# Patient Record
Sex: FEMALE | Race: WHITE | NOT HISPANIC OR LATINO | Employment: UNEMPLOYED | URBAN - NONMETROPOLITAN AREA
[De-identification: names, ages, dates, MRNs, and addresses within clinical notes are randomized per-mention and may not be internally consistent; named-entity substitution may affect disease eponyms.]

---

## 2019-05-03 ENCOUNTER — HOSPITAL ENCOUNTER (EMERGENCY)
Facility: HOSPITAL | Age: 39
Discharge: HOME | End: 2019-05-03
Attending: EMERGENCY MEDICINE | Admitting: EMERGENCY MEDICINE

## 2019-05-03 ENCOUNTER — APPOINTMENT (OUTPATIENT)
Dept: GENERAL RADIOLOGY | Facility: HOSPITAL | Age: 39
End: 2019-05-03

## 2019-05-03 VITALS
BODY MASS INDEX: 25.36 KG/M2 | RESPIRATION RATE: 20 BRPM | TEMPERATURE: 98.1 F | SYSTOLIC BLOOD PRESSURE: 128 MMHG | WEIGHT: 143.1 LBS | DIASTOLIC BLOOD PRESSURE: 95 MMHG | HEIGHT: 63 IN | HEART RATE: 101 BPM | OXYGEN SATURATION: 99 %

## 2019-05-03 DIAGNOSIS — S46.911A STRAIN OF RIGHT SHOULDER, INITIAL ENCOUNTER: Primary | ICD-10-CM

## 2019-05-03 PROCEDURE — 73030 X-RAY EXAM OF SHOULDER: CPT

## 2019-05-03 PROCEDURE — 99283 EMERGENCY DEPT VISIT LOW MDM: CPT

## 2019-05-03 PROCEDURE — 25010000002 ORPHENADRINE CITRATE PER 60 MG: Performed by: PHYSICIAN ASSISTANT

## 2019-05-03 PROCEDURE — 96372 THER/PROPH/DIAG INJ SC/IM: CPT

## 2019-05-03 PROCEDURE — 25010000002 KETOROLAC TROMETHAMINE PER 15 MG: Performed by: PHYSICIAN ASSISTANT

## 2019-05-03 RX ORDER — KETOROLAC TROMETHAMINE 30 MG/ML
30 INJECTION, SOLUTION INTRAMUSCULAR; INTRAVENOUS EVERY 6 HOURS PRN
Status: DISCONTINUED | OUTPATIENT
Start: 2019-05-03 | End: 2019-05-03 | Stop reason: HOSPADM

## 2019-05-03 RX ORDER — KETOROLAC TROMETHAMINE 10 MG/1
10 TABLET, FILM COATED ORAL EVERY 6 HOURS PRN
Qty: 20 TABLET | Refills: 0 | Status: SHIPPED | OUTPATIENT
Start: 2019-05-03 | End: 2019-05-08

## 2019-05-03 RX ORDER — ORPHENADRINE CITRATE 30 MG/ML
60 INJECTION INTRAMUSCULAR; INTRAVENOUS ONCE
Status: COMPLETED | OUTPATIENT
Start: 2019-05-03 | End: 2019-05-03

## 2019-05-03 RX ORDER — CYCLOBENZAPRINE HCL 10 MG
10 TABLET ORAL 2 TIMES DAILY PRN
Qty: 14 TABLET | Refills: 0 | Status: SHIPPED | OUTPATIENT
Start: 2019-05-03 | End: 2019-05-10

## 2019-05-03 RX ORDER — HYDROCODONE BITARTRATE AND ACETAMINOPHEN 5; 325 MG/1; MG/1
1 TABLET ORAL ONCE
Status: COMPLETED | OUTPATIENT
Start: 2019-05-03 | End: 2019-05-03

## 2019-05-03 RX ADMIN — KETOROLAC TROMETHAMINE 30 MG: 30 INJECTION, SOLUTION INTRAMUSCULAR at 14:31

## 2019-05-03 RX ADMIN — HYDROCODONE BITARTRATE AND ACETAMINOPHEN 1 TABLET: 5; 325 TABLET ORAL at 15:27

## 2019-05-03 RX ADMIN — ORPHENADRINE CITRATE 60 MG: 30 INJECTION INTRAMUSCULAR; INTRAVENOUS at 14:31

## 2019-05-03 NOTE — DISCHARGE INSTRUCTIONS
Please call [facility] in 3 days if pain does not resolve and to establish a primary care doctor. Please rest the next 7 days with light activity. Return to the ER with any concerning or worsening symptoms.

## 2019-05-03 NOTE — ED PROVIDER NOTES
Subjective   Pt reports she was moving furniture 3 weeks ago when R shoulder pain started and now in the ED due to worsening shoulder pain.         History provided by:  Patient   used: No    Upper Extremity Issue   Location:  Shoulder  Shoulder location:  R shoulder  Pain details:     Quality:  Sharp    Severity:  Moderate    Onset quality:  Gradual    Duration:  3 weeks    Timing:  Constant    Progression:  Worsening  Worsened by:  Movement  Ineffective treatments:  NSAIDs  Associated symptoms: no fatigue        Review of Systems   Constitutional: Negative for fatigue.   HENT: Negative for congestion.    Respiratory: Negative for cough and shortness of breath.    Gastrointestinal: Negative for vomiting.   Endocrine: Negative for polyuria.   Musculoskeletal: Positive for arthralgias (R shoulder).   Skin: Negative for color change.   Neurological: Negative for syncope.   Psychiatric/Behavioral: Negative for agitation.   All other systems reviewed and are negative.      Past Medical History:   Diagnosis Date   • Migraine        No Known Allergies    Past Surgical History:   Procedure Laterality Date   • ANKLE SURGERY Right    • CHOLECYSTECTOMY     • HYSTERECTOMY     • WRIST SURGERY         History reviewed. No pertinent family history.    Social History     Socioeconomic History   • Marital status:      Spouse name: Not on file   • Number of children: Not on file   • Years of education: Not on file   • Highest education level: Not on file   Tobacco Use   • Smoking status: Current Every Day Smoker     Packs/day: 0.50     Types: Cigarettes   Substance and Sexual Activity   • Alcohol use: Yes     Frequency: Never     Comment: socially   • Drug use: No           Objective   Physical Exam   Constitutional: She is oriented to person, place, and time. She appears well-developed and well-nourished.   HENT:   Head: Normocephalic.   Right Ear: Hearing normal.   Left Ear: Hearing normal.   Nose: Nose  normal.   Eyes: Conjunctivae, EOM and lids are normal.   Neck: Trachea normal and full passive range of motion without pain.   Cardiovascular: Regular rhythm, S1 normal, S2 normal, normal heart sounds and normal pulses.   Pulmonary/Chest: Effort normal and breath sounds normal.   Abdominal: Normal appearance and bowel sounds are normal.   Musculoskeletal:        Right shoulder: She exhibits decreased range of motion and tenderness.   Bilateral UE: neurovascularly intact   Neurological: She is alert and oriented to person, place, and time. She is not disoriented.   Skin: Skin is warm and dry. She is not diaphoretic.   Psychiatric: She has a normal mood and affect. Her speech is normal and behavior is normal. Thought content normal.   Nursing note and vitals reviewed.      Procedures           Labs Reviewed - No data to display    XR Shoulder 2+ View Right   Final Result   CONCLUSION:   Normal right shoulder      [id number]      Electronically signed by:  Brent Elias MD  5/3/2019 2:56 PM CDT   Workstation: [email or web address]            ED Course        3:43P  Rechecked pt and informed unremarkable x-ray results. Will discharge on toradol and muscle relaxers. Pt agrees with plan of care and all questions addressed at this time.           MDM      Final diagnoses:   Strain of right shoulder, initial encounter            Millie Brar PA-C  05/08/19 0939

## 2019-05-21 ENCOUNTER — OFFICE VISIT (OUTPATIENT)
Dept: ORTHOPEDIC SURGERY | Facility: CLINIC | Age: 39
End: 2019-05-21

## 2019-05-21 VITALS — WEIGHT: 143 LBS | BODY MASS INDEX: 25.34 KG/M2 | HEIGHT: 63 IN

## 2019-05-21 DIAGNOSIS — M54.2 CERVICAL SPINE PAIN: ICD-10-CM

## 2019-05-21 DIAGNOSIS — M25.511 RIGHT SHOULDER PAIN, UNSPECIFIED CHRONICITY: ICD-10-CM

## 2019-05-21 DIAGNOSIS — M79.2 RADICULAR PAIN OF RIGHT UPPER EXTREMITY: ICD-10-CM

## 2019-05-21 DIAGNOSIS — M75.101 ROTATOR CUFF SYNDROME, RIGHT: Primary | ICD-10-CM

## 2019-05-21 PROCEDURE — 99406 BEHAV CHNG SMOKING 3-10 MIN: CPT | Performed by: ORTHOPAEDIC SURGERY

## 2019-05-21 PROCEDURE — 99204 OFFICE O/P NEW MOD 45 MIN: CPT | Performed by: ORTHOPAEDIC SURGERY

## 2019-05-21 RX ORDER — MELOXICAM 15 MG/1
TABLET ORAL
Qty: 30 TABLET | Refills: 3 | Status: SHIPPED | OUTPATIENT
Start: 2019-05-21 | End: 2019-08-22

## 2019-05-21 NOTE — PROGRESS NOTES
Anne-Marie Lu is a 38 y.o. female   Primary provider:  Provider, No Known       Chief Complaint   Patient presents with   • Right Shoulder - Pain       HISTORY OF PRESENT ILLNESS: Patient is here today for right shoulder pain. Patient states that she has pain from her mid clavicle to her mid upper arm. Patient brought a disc with xrays and MRI. She states that her pain is a constant 8/10.    Patient states that she had a torn muscle in her shoulder approximately 6 months ago.  She was working as a  and had a pop while sliding a 50 pound box under a counter.  She states that since then she is been having some burning pains.  She was doing physical therapy for approximately 6 weeks and got some better but continued having difficulties.  Patient states that she had an MRI which showed tearing of the muscles at that time.      She states that she saw orthopedics again a couple of weeks ago with a new MRI which showed that she had bone spurs but did not have tearing of the muscle.  She was recommended at that time to restart physical therapy and had a steroid injection.  She states that she has had no improvement from the steroid injection.  She states that she has a burning feeling and she feels like her arm is on fire from the base of her neck to care home down her upper arm.  She reports no new injury.    She states that she was taking naproxen but that she was having stomach irritation because of that.  She also reports having had prior ulcers.    History of Present Illness     CONCURRENT MEDICAL HISTORY:    Past Medical History:   Diagnosis Date   • Migraine        No Known Allergies      Current Outpatient Medications:   •  meloxicam (MOBIC) 15 MG tablet, 1 PO Daily with food., Disp: 30 tablet, Rfl: 3    Past Surgical History:   Procedure Laterality Date   • ANKLE SURGERY Right    • CHOLECYSTECTOMY     • HYSTERECTOMY     • WRIST SURGERY         History reviewed. No pertinent family history.     Social  "History     Socioeconomic History   • Marital status:      Spouse name: Not on file   • Number of children: Not on file   • Years of education: Not on file   • Highest education level: Not on file   Tobacco Use   • Smoking status: Current Every Day Smoker     Packs/day: 0.50     Types: Cigarettes   • Smokeless tobacco: Never Used   Substance and Sexual Activity   • Alcohol use: Yes     Frequency: Never     Comment: socially   • Drug use: No        Review of Systems   Constitutional: Negative.    HENT: Positive for postnasal drip.    Eyes: Negative.    Respiratory: Negative.    Cardiovascular: Negative.    Gastrointestinal: Negative.    Endocrine: Positive for cold intolerance and heat intolerance.   Genitourinary: Negative.    Musculoskeletal:        Right shoulder pain   Skin: Negative.    Allergic/Immunologic: Negative.    Neurological: Positive for headaches.   Hematological: Negative.    Psychiatric/Behavioral: Negative.        PHYSICAL EXAMINATION:       Ht 160 cm (63\")   Wt 64.9 kg (143 lb)   BMI 25.33 kg/m²     Physical Exam   Constitutional: She is oriented to person, place, and time. She appears well-developed and well-nourished. No distress.   Eyes: Conjunctivae are normal. Pupils are equal, round, and reactive to light.   Neck: Neck supple. No tracheal deviation present. No thyromegaly present.   Cardiovascular: Normal rate and intact distal pulses.   Pulmonary/Chest: Effort normal. She exhibits no tenderness.   Abdominal: Soft. She exhibits no distension and no mass. There is no tenderness.   Neurological: She is alert and oriented to person, place, and time.   Skin: Skin is warm. No rash noted.   Psychiatric: She has a normal mood and affect. Her behavior is normal. Judgment and thought content normal.       GAIT:     [x]  Normal  []  Antalgic    Assistive device: [x]  None  []  Walker     []  Crutches  []  Cane     []  Wheelchair  []  Stretcher    Right Shoulder Exam     Tenderness   The " patient is experiencing tenderness in the acromioclavicular joint and acromion.    Range of Motion   Active abduction: 70 (120 passively)   Forward flexion: 70 (130 passively)   Right shoulder internal rotation 0 degrees: To buttock.     Muscle Strength   Abduction: 3/5   Supraspinatus: 3/5     Tests   Lake test: positive  Cross arm: positive  Impingement: positive    Other   Erythema: absent  Sensation: normal  Pulse: present      Left Shoulder Exam     Tenderness   The patient is experiencing no tenderness.     Range of Motion   The patient has normal left shoulder ROM.    Muscle Strength   The patient has normal left shoulder strength.    Tests   Lake test: negative  Impingement: negative    Other   Erythema: absent  Sensation: normal  Pulse: present               Xr Shoulder 2+ View Right    Result Date: 5/3/2019  Narrative: Three view right shoulder HISTORY: Right shoulder pain AP films with the humerus in internal and external rotation and scapular Y view were obtained. COMPARISON: None No fracture or dislocation. No other osseous or articular abnormality.     Impression: CONCLUSION: Normal right shoulder [id number] Electronically signed by:  Brent Elias MD  5/3/2019 2:56 PM CDT Workstation: [email or web address]    Body mass index is 25.33 kg/m².      ASSESSMENT:    Diagnoses and all orders for this visit:    Rotator cuff syndrome, right    Right shoulder pain, unspecified chronicity  -     Cancel: XR Shoulder 2+ View Right  -     meloxicam (MOBIC) 15 MG tablet; 1 PO Daily with food.  -     Ambulatory Referral to Physical Therapy Evaluate and treat    Cervical spine pain  -     meloxicam (MOBIC) 15 MG tablet; 1 PO Daily with food.  -     Ambulatory Referral to Physical Therapy Evaluate and treat    Radicular pain of right upper extremity  -     meloxicam (MOBIC) 15 MG tablet; 1 PO Daily with food.  -     Ambulatory Referral to Physical Therapy Evaluate and treat          PLAN    I personally reviewed the MRI from  last week.  However, the MRI report was not available.  Patient also reportedly has an MRI approximately 6 months ago which was not made available to me.  And long discussion with patient regarding our further treatment options.    Firstly, we discussed that I needed the old MRI and the MRI reports in order to make a reasonable decision about the next steps treatment.  Secondly, we discussed continuing physical therapy with general motion and strengthening exercises.  We also discussed the possibility of cervical traction as a part of her therapy process.  Thirdly, we discussed a trial of meloxicam for anti-inflammatory medication.  Fourth, we discussed the numbness and tingling and burning sensation that she is having.  This may be more related to cervical spine or muscle spasm and should not be related directly to rotator cuff issues.  We discussed the possibility of an EMG to further assess the symptoms.  And finally, we discussed smoking cessation to help facilitate healing of any potential surgical intervention.        I advised Anne-Marie of the risks of continuing to use tobacco.    During this visit, I spent 4 minutes counseling the patient regarding tobacco cessation.      Return in about 4 weeks (around 6/18/2019) for recheck.        This document has been electronically signed by Mat Liu MD on May 22, 2019 1:47 PM

## 2019-08-22 ENCOUNTER — OFFICE VISIT (OUTPATIENT)
Dept: FAMILY MEDICINE CLINIC | Facility: CLINIC | Age: 39
End: 2019-08-22

## 2019-08-22 ENCOUNTER — LAB (OUTPATIENT)
Dept: LAB | Facility: HOSPITAL | Age: 39
End: 2019-08-22

## 2019-08-22 VITALS
HEIGHT: 63 IN | DIASTOLIC BLOOD PRESSURE: 80 MMHG | TEMPERATURE: 98.8 F | BODY MASS INDEX: 23.92 KG/M2 | HEART RATE: 78 BPM | WEIGHT: 135 LBS | SYSTOLIC BLOOD PRESSURE: 122 MMHG | OXYGEN SATURATION: 98 %

## 2019-08-22 DIAGNOSIS — G47.00 INSOMNIA, UNSPECIFIED TYPE: ICD-10-CM

## 2019-08-22 DIAGNOSIS — K21.9 GASTROESOPHAGEAL REFLUX DISEASE, ESOPHAGITIS PRESENCE NOT SPECIFIED: ICD-10-CM

## 2019-08-22 DIAGNOSIS — G93.31 POSTVIRAL FATIGUE SYNDROME: ICD-10-CM

## 2019-08-22 DIAGNOSIS — K21.9 GASTROESOPHAGEAL REFLUX DISEASE, ESOPHAGITIS PRESENCE NOT SPECIFIED: Primary | ICD-10-CM

## 2019-08-22 DIAGNOSIS — M25.50 POLYARTHRALGIA: ICD-10-CM

## 2019-08-22 PROCEDURE — 86140 C-REACTIVE PROTEIN: CPT

## 2019-08-22 PROCEDURE — 86038 ANTINUCLEAR ANTIBODIES: CPT

## 2019-08-22 PROCEDURE — 85027 COMPLETE CBC AUTOMATED: CPT

## 2019-08-22 PROCEDURE — 86664 EPSTEIN-BARR NUCLEAR ANTIGEN: CPT

## 2019-08-22 PROCEDURE — 80053 COMPREHEN METABOLIC PANEL: CPT

## 2019-08-22 PROCEDURE — 86665 EPSTEIN-BARR CAPSID VCA: CPT

## 2019-08-22 PROCEDURE — 36415 COLL VENOUS BLD VENIPUNCTURE: CPT

## 2019-08-22 PROCEDURE — 80074 ACUTE HEPATITIS PANEL: CPT

## 2019-08-22 PROCEDURE — 99203 OFFICE O/P NEW LOW 30 MIN: CPT | Performed by: FAMILY MEDICINE

## 2019-08-22 PROCEDURE — 86663 EPSTEIN-BARR ANTIBODY: CPT

## 2019-08-22 RX ORDER — PREDNISONE 20 MG/1
20 TABLET ORAL DAILY
Qty: 14 TABLET | Refills: 0 | Status: SHIPPED | OUTPATIENT
Start: 2019-08-22 | End: 2019-09-04

## 2019-08-22 RX ORDER — TRAZODONE HYDROCHLORIDE 50 MG/1
50 TABLET ORAL NIGHTLY
Qty: 30 TABLET | Refills: 3 | Status: SHIPPED | OUTPATIENT
Start: 2019-08-22 | End: 2019-12-05 | Stop reason: SDUPTHER

## 2019-08-22 RX ORDER — PANTOPRAZOLE SODIUM 40 MG/1
40 TABLET, DELAYED RELEASE ORAL DAILY
COMMUNITY
End: 2019-10-07 | Stop reason: SDUPTHER

## 2019-08-22 RX ORDER — MELOXICAM 7.5 MG/1
7.5 TABLET ORAL DAILY
Status: DISCONTINUED | OUTPATIENT
Start: 2019-08-22 | End: 2019-09-18

## 2019-08-22 RX ORDER — SUCRALFATE 1 G/1
1 TABLET ORAL 4 TIMES DAILY
COMMUNITY

## 2019-08-22 RX ORDER — OMEGA-3S/DHA/EPA/FISH OIL/D3 300MG-1000
400 CAPSULE ORAL DAILY
COMMUNITY
End: 2019-10-01

## 2019-08-23 LAB
ALBUMIN SERPL-MCNC: 5.3 G/DL (ref 3.5–5.2)
ALBUMIN/GLOB SERPL: 2 G/DL
ALP SERPL-CCNC: 70 U/L (ref 39–117)
ALT SERPL W P-5'-P-CCNC: 6 U/L (ref 1–33)
ANION GAP SERPL CALCULATED.3IONS-SCNC: 12.1 MMOL/L (ref 5–15)
AST SERPL-CCNC: 16 U/L (ref 1–32)
BILIRUB SERPL-MCNC: 0.3 MG/DL (ref 0.2–1.2)
BUN BLD-MCNC: 10 MG/DL (ref 6–20)
BUN/CREAT SERPL: 10.8 (ref 7–25)
CALCIUM SPEC-SCNC: 9.9 MG/DL (ref 8.6–10.5)
CHLORIDE SERPL-SCNC: 102 MMOL/L (ref 98–107)
CO2 SERPL-SCNC: 25.9 MMOL/L (ref 22–29)
CREAT BLD-MCNC: 0.93 MG/DL (ref 0.57–1)
CRP SERPL-MCNC: 0.07 MG/DL (ref 0–0.5)
DEPRECATED RDW RBC AUTO: 40.6 FL (ref 37–54)
ERYTHROCYTE [DISTWIDTH] IN BLOOD BY AUTOMATED COUNT: 12.4 % (ref 12.3–15.4)
GFR SERPL CREATININE-BSD FRML MDRD: 67 ML/MIN/1.73
GLOBULIN UR ELPH-MCNC: 2.7 GM/DL
GLUCOSE BLD-MCNC: 81 MG/DL (ref 65–99)
HAV IGM SERPL QL IA: NORMAL
HBV CORE IGM SERPL QL IA: NORMAL
HBV SURFACE AG SERPL QL IA: NORMAL
HCT VFR BLD AUTO: 40.7 % (ref 34–46.6)
HCV AB SER DONR QL: NORMAL
HGB BLD-MCNC: 13.4 G/DL (ref 12–15.9)
MCH RBC QN AUTO: 29.5 PG (ref 26.6–33)
MCHC RBC AUTO-ENTMCNC: 32.9 G/DL (ref 31.5–35.7)
MCV RBC AUTO: 89.6 FL (ref 79–97)
PLATELET # BLD AUTO: 272 10*3/MM3 (ref 140–450)
PMV BLD AUTO: 11.7 FL (ref 6–12)
POTASSIUM BLD-SCNC: 3.8 MMOL/L (ref 3.5–5.2)
PROT SERPL-MCNC: 8 G/DL (ref 6–8.5)
RBC # BLD AUTO: 4.54 10*6/MM3 (ref 3.77–5.28)
SODIUM BLD-SCNC: 140 MMOL/L (ref 136–145)
WBC NRBC COR # BLD: 5.79 10*3/MM3 (ref 3.4–10.8)

## 2019-08-23 NOTE — PROGRESS NOTES
"  Subjective:     Anne-Marie Ellison is a 38 y.o. female who presents for   Chief Complaint   Patient presents with   • Establish Care          38-year-old female with history of either peptic ulcer disease or GERD on proton pump inhibitor and Carafate easily managed by gastroenterology resents today for first-time visit to establish care.  She recently moved to [place] from [facility] she has had recurrent UTIs and for the past several months has had severe fatigue and generalized pain and arthralgia.  She notes onset of symptoms 6 to 8 months ago when she presented to the emergency room in [place] with a urinary tract infection.  She was seen 6 months later and told that they treated her with \"the wrong antibiotic\" since then her UTI symptoms have resolved however she continues to feel \"horrible\" she notes extreme fatigue and generalized arthralgias and myalgias.  She states that her UTI initially presented as a flulike illness and she continues to have symptoms although she denies UTI symptoms at present she was last seen in June of this year at urgent care and has previously seen primary care physician on several occasions but has not really had any significant relief work-up to date including work-up for tickborne illness has been negative she has been treated with multivitamins vitamin D and D after being told that her levels were in the low normal range however she has persistent pain in her hands knees ankles and hips with severe fatigue she also notes difficulty getting rest at night had previously been treated in [place] with high doses of Seroquel and Trileptal without success she had been treated with trazodone in the remote past but she does not recall if it helped or not she denies history of drug or alcohol abuse or substance abuse although she does drink socially.  Her  recently relocated to [place] and reportedly they are looking forward to his long term " when they can move back to [place] where she grew up.  She denies chest pain or dyspnea she does have a history of severe reflux with epigastric pain has been followed by gastroenterology in the past she currently is on PPI and Carafate and avoids foods which seem to exacerbate her symptoms.  Attempted treatment with nonsteroidals have caused GI flareups in the past she denies any nausea or vomiting otherwise no change in bowel habits denies per rectum she has had some fluctuations in her weight recently losing about 25 pounds but gaining back about 8 pounds attempts at treating her thumbnail with various OTC middays have met with little success these include Eletone and and diphenhydramine.  She has 3 children ages 1419 and 21 with her youngest still living at home she smokes 3 cigarettes a day denies EtOH or substance abuse         Past Medical Hx:  Past Medical History:   Diagnosis Date   • Migraine        Past Surgical Hx:  Past Surgical History:   Procedure Laterality Date   • ANKLE SURGERY Right    • CHOLECYSTECTOMY     • HYSTERECTOMY     • WRIST SURGERY         Health Maintenance:  Health Maintenance   Topic Date Due   • PNEUMOCOCCAL VACCINE (19-64 MEDIUM RISK) (1 of 1 - PPSV23) 12/07/1998   • TDAP/TD VACCINES (1 - Tdap) 12/07/1998   • MEDICARE ANNUAL WELLNESS  05/21/2019   • INFLUENZA VACCINE  08/01/2019       Current Meds:    Current Outpatient Medications:   •  B Complex Vitamins (B COMPLEX-B12 PO), Take  by mouth Daily., Disp: , Rfl:   •  cholecalciferol (VITAMIN D3) 400 units tablet, Take 400 Units by mouth Daily., Disp: , Rfl:   •  cholecalciferol (VITAMIN D3) 34347 units capsule, Take 50,000 Units by mouth 1 (One) Time Per Week., Disp: , Rfl:   •  pantoprazole (PROTONIX) 40 MG EC tablet, Take 40 mg by mouth Daily., Disp: , Rfl:   •  sucralfate (CARAFATE) 1 g tablet, Take 1 g by mouth 4 (Four) Times a Day., Disp: , Rfl:   •  predniSONE (DELTASONE) 20 MG tablet, Take 1 tablet by mouth Daily., Disp: 14  "tablet, Rfl: 0  •  traZODone (DESYREL) 50 MG tablet, Take 1 tablet by mouth Every Night., Disp: 30 tablet, Rfl: 3    Current Facility-Administered Medications:   •  meloxicam (MOBIC) tablet 7.5 mg, 7.5 mg, Oral, Daily, Zachary Nagy MD    Allergies:  Nsaids    Family Hx:  No family history on file.     Social History:  Social History     Socioeconomic History   • Marital status:      Spouse name: Not on file   • Number of children: Not on file   • Years of education: Not on file   • Highest education level: Not on file   Tobacco Use   • Smoking status: Current Every Day Smoker     Packs/day: 0.50     Types: Cigarettes   • Smokeless tobacco: Never Used   Substance and Sexual Activity   • Alcohol use: Yes     Frequency: Never     Comment: socially   • Drug use: No       Review of Systems  Review of Systems as per HPI otherwise -12 systems review    Objective:     /80 (BP Location: Left arm)   Pulse 78   Temp 98.8 °F (37.1 °C)   Ht 160 cm (62.99\")   Wt 61.2 kg (135 lb)   SpO2 98%   BMI 23.92 kg/m²   Physical Exam   General appearance is that of a well-developed well-nourished appearing adult female no acute distress oriented x3 HEENT grossly normal TMs normal pharynx clear no facial asymmetry noted neck supple without JVD bruits adenopathy or thyromegaly lungs are clear to auscultation and percussion without wheezes rales rhonchi tactile fremitus or pleural friction rub heart regular without murmur rub gallop or extra PMI diffuse abdomen soft nontender no guarding or rebound extremities show no clubbing cyanosis edema or spasticity no tremor neuro nonfocal no ataxia    Lab Review  No results found for this or any previous visit.         Assessment:     Anne-Marie was seen today for establish care.    Diagnoses and all orders for this visit:    Gastroesophageal reflux disease, esophagitis presence not specified  -     CBC No Differential; Future    Polyarthralgia  -     Comprehensive metabolic panel; " Future  -     C-reactive protein; Future  -     CBC No Differential; Future  -     SUHAIL; Future  -     meloxicam (MOBIC) tablet 7.5 mg    Postviral fatigue syndrome  -     Comprehensive metabolic panel; Future  -     C-reactive protein; Future  -     CBC No Differential; Future  -     Hepatitis panel, acute; Future  -     EBV, Chrnic / Active Infection; Future    Insomnia, unspecified type    Other orders  -     traZODone (DESYREL) 50 MG tablet; Take 1 tablet by mouth Every Night.  -     predniSONE (DELTASONE) 20 MG tablet; Take 1 tablet by mouth Daily.        Plan:   Will check CMP CBC CRP EBV hepatitis panel trial of therapy with meloxicam 7.5 daily prednisone 80 mg daily for 10 days consider polymyalgia rheumatica and differential versus EBV or hepatitis or other viral illness add trazodone 50 nightly continue current regimen otherwise recheck 2 weeks or as needed            This document has been electronically signed by Zachary Nagy MD on August 23, 2019 8:41 AM

## 2019-08-24 LAB
EBV EA IGG SER-ACNC: <9 U/ML (ref 0–8.9)
EBV NA IGG SER IA-ACNC: >600 U/ML (ref 0–17.9)
EBV VCA IGG SER-ACNC: >600 U/ML (ref 0–17.9)
INTERPRETATION: ABNORMAL

## 2019-08-26 LAB — ANA SER QL: NEGATIVE

## 2019-09-03 ENCOUNTER — TELEPHONE (OUTPATIENT)
Dept: FAMILY MEDICINE CLINIC | Facility: CLINIC | Age: 39
End: 2019-09-03

## 2019-09-03 NOTE — TELEPHONE ENCOUNTER
Patient called asking for a refill on her predniSONE (DELTASONE) 20 MG tablet. Patient uses [facility] in [place].    Thanks,  Zoë

## 2019-09-03 NOTE — TELEPHONE ENCOUNTER
Can decrease to 10 mg daily for 7 days then one half of the 10 mg tablet for 7 days then discontinue

## 2019-09-04 RX ORDER — PREDNISONE 10 MG/1
TABLET ORAL
Qty: 12 TABLET | Refills: 0 | Status: SHIPPED | OUTPATIENT
Start: 2019-09-04 | End: 2019-09-10 | Stop reason: DRUGHIGH

## 2019-09-10 ENCOUNTER — OFFICE VISIT (OUTPATIENT)
Dept: FAMILY MEDICINE CLINIC | Facility: CLINIC | Age: 39
End: 2019-09-10

## 2019-09-10 VITALS
OXYGEN SATURATION: 99 % | SYSTOLIC BLOOD PRESSURE: 110 MMHG | DIASTOLIC BLOOD PRESSURE: 68 MMHG | WEIGHT: 137 LBS | HEIGHT: 63 IN | BODY MASS INDEX: 24.27 KG/M2 | HEART RATE: 82 BPM

## 2019-09-10 DIAGNOSIS — M25.50 POLYARTHRALGIA: Primary | ICD-10-CM

## 2019-09-10 PROCEDURE — 99213 OFFICE O/P EST LOW 20 MIN: CPT | Performed by: FAMILY MEDICINE

## 2019-09-10 RX ORDER — DULOXETIN HYDROCHLORIDE 30 MG/1
30 CAPSULE, DELAYED RELEASE ORAL DAILY
Qty: 30 CAPSULE | Refills: 2 | Status: SHIPPED | OUTPATIENT
Start: 2019-09-10 | End: 2019-12-05 | Stop reason: SDUPTHER

## 2019-09-10 RX ORDER — PREDNISONE 1 MG/1
TABLET ORAL
Qty: 7 TABLET | Refills: 0 | Status: ON HOLD | OUTPATIENT
Start: 2019-09-10 | End: 2019-10-07

## 2019-09-10 NOTE — PROGRESS NOTES
Subjective:     Anne-Marie Ellison is a 38 y.o. female who presents for   Chief Complaint   Patient presents with   • Heartburn   • Results     labs          38-year-old female with history of EBV and poly-myalgia/polyarthralgia's presents today for one-month follow-up visit.  Lab work for her polyarthralgias came back essentially negative did have positive EBV titers and her fatigue is markedly improved on low-dose prednisone however she continues to have pain in the hip shoulder girdle distribution she states it is severe at times and was really not relieved with meloxicam.  She also has a history of gastritis currently managed by gastroenterology on PPI and Carafate GI symptoms are stable at this time         Past Medical Hx:  Past Medical History:   Diagnosis Date   • Migraine        Past Surgical Hx:  Past Surgical History:   Procedure Laterality Date   • ANKLE SURGERY Right    • CHOLECYSTECTOMY     • HYSTERECTOMY     • WRIST SURGERY         Health Maintenance:  Health Maintenance   Topic Date Due   • PNEUMOCOCCAL VACCINE (19-64 MEDIUM RISK) (1 of 1 - PPSV23) 12/07/1998   • TDAP/TD VACCINES (1 - Tdap) 12/07/1998   • MEDICARE ANNUAL WELLNESS  05/21/2019   • INFLUENZA VACCINE  08/01/2019       Current Meds:    Current Outpatient Medications:   •  B Complex Vitamins (B COMPLEX-B12 PO), Take  by mouth Daily., Disp: , Rfl:   •  cholecalciferol (VITAMIN D3) 400 units tablet, Take 400 Units by mouth Daily., Disp: , Rfl:   •  cholecalciferol (VITAMIN D3) 10670 units capsule, Take 50,000 Units by mouth 1 (One) Time Per Week., Disp: , Rfl:   •  pantoprazole (PROTONIX) 40 MG EC tablet, Take 40 mg by mouth Daily., Disp: , Rfl:   •  sucralfate (CARAFATE) 1 g tablet, Take 1 g by mouth 4 (Four) Times a Day., Disp: , Rfl:   •  traZODone (DESYREL) 50 MG tablet, Take 1 tablet by mouth Every Night., Disp: 30 tablet, Rfl: 3  •  DULoxetine (CYMBALTA) 30 MG capsule, Take 1 capsule by mouth Daily., Disp: 30 capsule, Rfl: 2  •   "predniSONE (DELTASONE) 5 MG tablet, Take 1/2 tablet daily for 2 weeks then discontinue, Disp: 7 tablet, Rfl: 0    Current Facility-Administered Medications:   •  meloxicam (MOBIC) tablet 7.5 mg, 7.5 mg, Oral, Daily, Zachary Nagy MD    Allergies:  Nsaids    Family Hx:  No family history on file.     Social History:  Social History     Socioeconomic History   • Marital status:      Spouse name: Not on file   • Number of children: Not on file   • Years of education: Not on file   • Highest education level: Not on file   Tobacco Use   • Smoking status: Current Every Day Smoker     Packs/day: 0.50     Types: Cigarettes   • Smokeless tobacco: Never Used   Substance and Sexual Activity   • Alcohol use: Yes     Frequency: Never     Comment: socially   • Drug use: No       Review of Systems  Review of Systems as per HPI no fevers or night sweats no change in bowel habits no weight loss she still gets short of breath with exertion and reportedly is down to 2 or 3 cigarettes a day she continues to work full-time as a     Objective:     /68 (BP Location: Left arm)   Pulse 82   Ht 160 cm (62.99\")   Wt 62.1 kg (137 lb)   SpO2 99%   BMI 24.27 kg/m²   Physical Exam   Exam alert no distress ENT grossly normal neck supple without JVD or adenopathy lungs clear breath sounds throughout heart regular no extrasystole PMI diffuse abdomen soft nontender extremities show no edema no tremor neuro nonfocal no ataxia    Lab Review  Results for orders placed or performed in visit on 08/22/19   Comprehensive metabolic panel   Result Value Ref Range    Glucose 81 65 - 99 mg/dL    BUN 10 6 - 20 mg/dL    Creatinine 0.93 0.57 - 1.00 mg/dL    Sodium 140 136 - 145 mmol/L    Potassium 3.8 3.5 - 5.2 mmol/L    Chloride 102 98 - 107 mmol/L    CO2 25.9 22.0 - 29.0 mmol/L    Calcium 9.9 8.6 - 10.5 mg/dL    Total Protein 8.0 6.0 - 8.5 g/dL    Albumin 5.30 (H) 3.50 - 5.20 g/dL    ALT (SGPT) 6 1 - 33 U/L    AST (SGOT) 16 1 - 32 " U/L    Alkaline Phosphatase 70 39 - 117 U/L    Total Bilirubin 0.3 0.2 - 1.2 mg/dL    eGFR Non African Amer 67 >60 mL/min/1.73    Globulin 2.7 gm/dL    A/G Ratio 2.0 g/dL    BUN/Creatinine Ratio 10.8 7.0 - 25.0    Anion Gap 12.1 5.0 - 15.0 mmol/L   C-reactive protein   Result Value Ref Range    C-Reactive Protein 0.07 0.00 - 0.50 mg/dL   CBC No Differential   Result Value Ref Range    WBC 5.79 3.40 - 10.80 10*3/mm3    RBC 4.54 3.77 - 5.28 10*6/mm3    Hemoglobin 13.4 12.0 - 15.9 g/dL    Hematocrit 40.7 34.0 - 46.6 %    MCV 89.6 79.0 - 97.0 fL    MCH 29.5 26.6 - 33.0 pg    MCHC 32.9 31.5 - 35.7 g/dL    RDW 12.4 12.3 - 15.4 %    RDW-SD 40.6 37.0 - 54.0 fl    MPV 11.7 6.0 - 12.0 fL    Platelets 272 140 - 450 10*3/mm3   SUHAIL   Result Value Ref Range    SUHAIL Direct Negative Negative   Hepatitis panel, acute   Result Value Ref Range    Hepatitis B Surface Ag Non-Reactive Non-Reactive    Hep A IgM Non-Reactive Non-Reactive    Hep B C IgM Non-Reactive Non-Reactive    Hepatitis C Ab Non-Reactive Non-Reactive   EBV, Chrnic / Active Infection   Result Value Ref Range    EBV Early Antigen Ab, IgG <9.0 0.0 - 8.9 U/mL    EBV VCA IgG >600.0 (H) 0.0 - 17.9 U/mL    EBV Nuclear Antigen Ab, IgG >600.0 (H) 0.0 - 17.9 U/mL    Interpretation Comment             Assessment:     Anne-Marie was seen today for heartburn and results.    Diagnoses and all orders for this visit:    Polyarthralgia  -     Ambulatory Referral to Rheumatology    Other orders  -     predniSONE (DELTASONE) 5 MG tablet; Take 1/2 tablet daily for 2 weeks then discontinue  -     DULoxetine (CYMBALTA) 30 MG capsule; Take 1 capsule by mouth Daily.      Patient Active Problem List   Diagnosis   • Right shoulder pain   • Postviral fatigue syndrome   • Polyarthralgia   • GERD (gastroesophageal reflux disease)   • Insomnia         Plan:   Her post viral fatigue is markedly improved we will continue to wean her prednisone will cut down to 2.5 mg for 2 weeks then try stopping it  altogether add Cymbalta 30 mg daily as an adjunct for pain control referral for rheumatologic evaluation for her polyarthralgias/polymyalgias.  Recheck 3 months or as needed advised patient to contact us in a month and let us know how she is doing on the Cymbalta            This document has been electronically signed by Zachary Nagy MD on September 10, 2019 1:09 PM

## 2019-09-12 ENCOUNTER — APPOINTMENT (OUTPATIENT)
Dept: CT IMAGING | Facility: HOSPITAL | Age: 39
End: 2019-09-12

## 2019-09-12 ENCOUNTER — HOSPITAL ENCOUNTER (EMERGENCY)
Facility: HOSPITAL | Age: 39
Discharge: ANOTHER HOSPITAL | End: 2019-09-13
Attending: EMERGENCY MEDICINE | Admitting: EMERGENCY MEDICINE

## 2019-09-12 DIAGNOSIS — R56.9 SEIZURE-LIKE ACTIVITY (HCC): Primary | ICD-10-CM

## 2019-09-12 LAB
ALBUMIN SERPL-MCNC: 4.2 G/DL (ref 3.5–5.2)
ALBUMIN/GLOB SERPL: 1.4 G/DL
ALP SERPL-CCNC: 59 U/L (ref 39–117)
ALT SERPL W P-5'-P-CCNC: 10 U/L (ref 1–33)
AMPHET+METHAMPHET UR QL: NEGATIVE
AMPHETAMINES UR QL: NEGATIVE
ANION GAP SERPL CALCULATED.3IONS-SCNC: 15 MMOL/L (ref 5–15)
AST SERPL-CCNC: 13 U/L (ref 1–32)
BARBITURATES UR QL SCN: NEGATIVE
BASOPHILS # BLD AUTO: 0.01 10*3/MM3 (ref 0–0.2)
BASOPHILS NFR BLD AUTO: 0.1 % (ref 0–1.5)
BENZODIAZ UR QL SCN: NEGATIVE
BILIRUB SERPL-MCNC: 0.3 MG/DL (ref 0.2–1.2)
BILIRUB UR QL STRIP: ABNORMAL
BUN BLD-MCNC: 14 MG/DL (ref 6–20)
BUN/CREAT SERPL: 16.7 (ref 7–25)
BUPRENORPHINE SERPL-MCNC: NEGATIVE NG/ML
CALCIUM SPEC-SCNC: 9.8 MG/DL (ref 8.6–10.5)
CANNABINOIDS SERPL QL: POSITIVE
CHLORIDE SERPL-SCNC: 104 MMOL/L (ref 98–107)
CK SERPL-CCNC: 119 U/L (ref 20–180)
CLARITY UR: ABNORMAL
CO2 SERPL-SCNC: 23 MMOL/L (ref 22–29)
COCAINE UR QL: NEGATIVE
COLOR UR: YELLOW
CREAT BLD-MCNC: 0.84 MG/DL (ref 0.57–1)
D-LACTATE SERPL-SCNC: 0.7 MMOL/L (ref 0.5–2)
DEPRECATED RDW RBC AUTO: 41.1 FL (ref 37–54)
EOSINOPHIL # BLD AUTO: 0 10*3/MM3 (ref 0–0.4)
EOSINOPHIL NFR BLD AUTO: 0 % (ref 0.3–6.2)
ERYTHROCYTE [DISTWIDTH] IN BLOOD BY AUTOMATED COUNT: 13 % (ref 12.3–15.4)
ETHANOL BLD-MCNC: <10 MG/DL (ref 0–10)
ETHANOL UR QL: <0.01 %
GFR SERPL CREATININE-BSD FRML MDRD: 75 ML/MIN/1.73
GLOBULIN UR ELPH-MCNC: 2.9 GM/DL
GLUCOSE BLD-MCNC: 82 MG/DL (ref 65–99)
GLUCOSE BLDC GLUCOMTR-MCNC: 89 MG/DL (ref 70–130)
GLUCOSE UR STRIP-MCNC: NEGATIVE MG/DL
HCG SERPL QL: NEGATIVE
HCT VFR BLD AUTO: 37.9 % (ref 34–46.6)
HGB BLD-MCNC: 13.3 G/DL (ref 12–15.9)
HGB UR QL STRIP.AUTO: NEGATIVE
IMM GRANULOCYTES # BLD AUTO: 0.04 10*3/MM3 (ref 0–0.05)
IMM GRANULOCYTES NFR BLD AUTO: 0.4 % (ref 0–0.5)
KETONES UR QL STRIP: ABNORMAL
LEUKOCYTE ESTERASE UR QL STRIP.AUTO: NEGATIVE
LYMPHOCYTES # BLD AUTO: 3.26 10*3/MM3 (ref 0.7–3.1)
LYMPHOCYTES NFR BLD AUTO: 34.1 % (ref 19.6–45.3)
MAGNESIUM SERPL-MCNC: 1.9 MG/DL (ref 1.6–2.6)
MCH RBC QN AUTO: 30.6 PG (ref 26.6–33)
MCHC RBC AUTO-ENTMCNC: 35.1 G/DL (ref 31.5–35.7)
MCV RBC AUTO: 87.3 FL (ref 79–97)
METHADONE UR QL SCN: NEGATIVE
MONOCYTES # BLD AUTO: 0.52 10*3/MM3 (ref 0.1–0.9)
MONOCYTES NFR BLD AUTO: 5.4 % (ref 5–12)
NEUTROPHILS # BLD AUTO: 5.74 10*3/MM3 (ref 1.7–7)
NEUTROPHILS NFR BLD AUTO: 60 % (ref 42.7–76)
NITRITE UR QL STRIP: NEGATIVE
NRBC BLD AUTO-RTO: 0 /100 WBC (ref 0–0.2)
OPIATES UR QL: NEGATIVE
OXYCODONE UR QL SCN: NEGATIVE
PCP UR QL SCN: NEGATIVE
PH UR STRIP.AUTO: 6 [PH] (ref 5–9)
PLATELET # BLD AUTO: 236 10*3/MM3 (ref 140–450)
PMV BLD AUTO: 9.9 FL (ref 6–12)
POTASSIUM BLD-SCNC: 3.5 MMOL/L (ref 3.5–5.2)
PROPOXYPH UR QL: NEGATIVE
PROT SERPL-MCNC: 7.1 G/DL (ref 6–8.5)
PROT UR QL STRIP: NEGATIVE
RBC # BLD AUTO: 4.34 10*6/MM3 (ref 3.77–5.28)
SODIUM BLD-SCNC: 142 MMOL/L (ref 136–145)
SP GR UR STRIP: 1.02 (ref 1–1.03)
TRICYCLICS UR QL SCN: NEGATIVE
TSH SERPL DL<=0.05 MIU/L-ACNC: 3.35 UIU/ML (ref 0.27–4.2)
UROBILINOGEN UR QL STRIP: ABNORMAL
WBC NRBC COR # BLD: 9.57 10*3/MM3 (ref 3.4–10.8)

## 2019-09-12 PROCEDURE — 83605 ASSAY OF LACTIC ACID: CPT | Performed by: EMERGENCY MEDICINE

## 2019-09-12 PROCEDURE — 82550 ASSAY OF CK (CPK): CPT | Performed by: EMERGENCY MEDICINE

## 2019-09-12 PROCEDURE — 83735 ASSAY OF MAGNESIUM: CPT | Performed by: EMERGENCY MEDICINE

## 2019-09-12 PROCEDURE — 82962 GLUCOSE BLOOD TEST: CPT

## 2019-09-12 PROCEDURE — 99285 EMERGENCY DEPT VISIT HI MDM: CPT

## 2019-09-12 PROCEDURE — 85025 COMPLETE CBC W/AUTO DIFF WBC: CPT | Performed by: EMERGENCY MEDICINE

## 2019-09-12 PROCEDURE — 84443 ASSAY THYROID STIM HORMONE: CPT | Performed by: EMERGENCY MEDICINE

## 2019-09-12 PROCEDURE — 84703 CHORIONIC GONADOTROPIN ASSAY: CPT | Performed by: EMERGENCY MEDICINE

## 2019-09-12 PROCEDURE — 93010 ELECTROCARDIOGRAM REPORT: CPT | Performed by: INTERNAL MEDICINE

## 2019-09-12 PROCEDURE — 80307 DRUG TEST PRSMV CHEM ANLYZR: CPT | Performed by: EMERGENCY MEDICINE

## 2019-09-12 PROCEDURE — 84146 ASSAY OF PROLACTIN: CPT | Performed by: EMERGENCY MEDICINE

## 2019-09-12 PROCEDURE — 80306 DRUG TEST PRSMV INSTRMNT: CPT | Performed by: EMERGENCY MEDICINE

## 2019-09-12 PROCEDURE — 80053 COMPREHEN METABOLIC PANEL: CPT | Performed by: EMERGENCY MEDICINE

## 2019-09-12 PROCEDURE — 93005 ELECTROCARDIOGRAM TRACING: CPT

## 2019-09-12 PROCEDURE — 81003 URINALYSIS AUTO W/O SCOPE: CPT | Performed by: EMERGENCY MEDICINE

## 2019-09-12 PROCEDURE — 93005 ELECTROCARDIOGRAM TRACING: CPT | Performed by: EMERGENCY MEDICINE

## 2019-09-12 PROCEDURE — 70450 CT HEAD/BRAIN W/O DYE: CPT

## 2019-09-12 RX ORDER — CELECOXIB 100 MG/1
30 CAPSULE ORAL DAILY
COMMUNITY
End: 2019-09-18

## 2019-09-12 RX ORDER — SODIUM CHLORIDE 0.9 % (FLUSH) 0.9 %
10 SYRINGE (ML) INJECTION AS NEEDED
Status: DISCONTINUED | OUTPATIENT
Start: 2019-09-12 | End: 2019-09-13 | Stop reason: HOSPADM

## 2019-09-13 VITALS
SYSTOLIC BLOOD PRESSURE: 133 MMHG | DIASTOLIC BLOOD PRESSURE: 90 MMHG | RESPIRATION RATE: 20 BRPM | HEIGHT: 63 IN | TEMPERATURE: 98.2 F | OXYGEN SATURATION: 96 % | HEART RATE: 64 BPM | BODY MASS INDEX: 24.68 KG/M2 | WEIGHT: 139.3 LBS

## 2019-09-13 LAB
PROLACTIN SERPL-MCNC: 24.3 NG/ML (ref 4.79–23.3)
WHOLE BLOOD HOLD SPECIMEN: NORMAL

## 2019-09-13 RX ORDER — LORAZEPAM 2 MG/ML
2 INJECTION INTRAMUSCULAR ONCE
Status: DISCONTINUED | OUTPATIENT
Start: 2019-09-13 | End: 2019-09-13 | Stop reason: HOSPADM

## 2019-09-13 RX ORDER — NICOTINE 21 MG/24HR
1 PATCH, TRANSDERMAL 24 HOURS TRANSDERMAL
Status: DISCONTINUED | OUTPATIENT
Start: 2019-09-13 | End: 2019-09-13 | Stop reason: HOSPADM

## 2019-09-13 RX ADMIN — NICOTINE 1 PATCH: 21 PATCH TRANSDERMAL at 02:04

## 2019-09-13 NOTE — ED NOTES
This RN was at the nurses station, pts family came out of their room and informed another RN (JAVIER Pruett) pt was having another seizure. JAVIER Pruett informed this RN after assessing pt. This RN went into the room and unit secretary overhead paged for a physician to go to the pts room. Dr Mccurdy made room visit and assessed pt. This RN, Flynn, RN, charge RN, nursing student, and ED tech at bedside.      Lola Galicia RN  09/12/19 [id number]

## 2019-09-13 NOTE — ED PROVIDER NOTES
Subjective   38-year-old female with history of reported seizures not on any type of seizure medication presents the emergency department with reported seizure this evening.  Family states the patient's history of seizures include 2 seizures about 6 years ago.  Has been reports that they just lay down to get asleep when she began shaking.  They report that she became unresponsive and turned blue.  Seizure self aborted prior to EMS arrival.  There is no loss of bowel or bladder incontinence.  Patient has been dealing with some polymyalgia symptoms and is supposedly was recently diagnosed with Sunil-Barr.  Recently started on trazodone as well as Celebrex and a course of prednisone.  Denies fevers, vomiting or diarrhea.    Family history, surgical history, social history, current medications and allergies are reviewed with the patient and triage documentation and vitals are reviewed.          History provided by:  Patient, spouse and parent   used: No        Review of Systems   Constitutional: Negative for chills and fever.   HENT: Negative for congestion, sinus pressure and sinus pain.    Eyes: Negative for photophobia and visual disturbance.   Respiratory: Negative for cough, shortness of breath and wheezing.    Cardiovascular: Negative for chest pain, palpitations and leg swelling.   Gastrointestinal: Negative for abdominal pain, constipation, diarrhea, nausea and vomiting.   Endocrine: Negative.    Genitourinary: Negative for frequency and urgency.   Musculoskeletal: Positive for arthralgias. Negative for back pain, myalgias and neck pain.   Skin: Negative for color change, pallor, rash and wound.   Allergic/Immunologic: Negative.    Neurological: Positive for seizures. Negative for dizziness, facial asymmetry, weakness, light-headedness and headaches.   Hematological: Negative.    Psychiatric/Behavioral: Negative.        Past Medical History:   Diagnosis Date   • Migraine        Allergies    Allergen Reactions   • Nsaids GI Intolerance     Pt has ulcers       Past Surgical History:   Procedure Laterality Date   • ANKLE SURGERY Right    • CHOLECYSTECTOMY     • HYSTERECTOMY     • WRIST SURGERY         History reviewed. No pertinent family history.    Social History     Socioeconomic History   • Marital status:      Spouse name: Not on file   • Number of children: Not on file   • Years of education: Not on file   • Highest education level: Not on file   Tobacco Use   • Smoking status: Current Every Day Smoker     Packs/day: 0.25     Types: Cigarettes   • Smokeless tobacco: Never Used   Substance and Sexual Activity   • Alcohol use: Yes     Frequency: Never     Comment: socially   • Drug use: No           Objective   Physical Exam   Constitutional: She is oriented to person, place, and time. She appears well-developed and well-nourished. No distress.   HENT:   Head: Normocephalic and atraumatic.   Mouth/Throat: Oropharynx is clear and moist and mucous membranes are normal. No oral lesions. No lacerations.   Eyes: Conjunctivae are normal. Pupils are equal, round, and reactive to light.   Neck: Normal range of motion. Neck supple. No JVD present. No thyromegaly present.   Cardiovascular: Normal rate, regular rhythm, normal heart sounds and intact distal pulses.   No murmur heard.  Pulmonary/Chest: Effort normal and breath sounds normal. No respiratory distress.   Abdominal: Soft. Bowel sounds are normal. There is no tenderness.   Musculoskeletal: Normal range of motion. She exhibits no edema.   Neurological: She is alert and oriented to person, place, and time. She has normal strength. No cranial nerve deficit or sensory deficit. She displays no seizure activity. GCS eye subscore is 4. GCS verbal subscore is 5. GCS motor subscore is 6.   Reflex Scores:       Bicep reflexes are 2+ on the right side and 2+ on the left side.       Patellar reflexes are 2+ on the right side and 2+ on the left  side.  Skin: Skin is warm and dry. Capillary refill takes less than 2 seconds. No rash noted. She is not diaphoretic.   Psychiatric: Her speech is normal and behavior is normal. Judgment and thought content normal. Her mood appears anxious (tearful). Cognition and memory are normal.   Nursing note and vitals reviewed.      Procedures  none     ED Course      Labs Reviewed   URINALYSIS W/ CULTURE IF INDICATED - Abnormal; Notable for the following components:       Result Value    Appearance, UA Cloudy (*)     Ketones, UA Trace (*)     Bilirubin, UA Small (1+) (*)     All other components within normal limits    Narrative:     Urine microscopic not indicated.   URINE DRUG SCREEN - Abnormal; Notable for the following components:    THC, Screen, Urine Positive (*)     All other components within normal limits    Narrative:     Cutoff For Drugs Screened:    Amphetamines               500 ng/ml  Barbiturates               200 ng/ml  Benzodiazepines            150 ng/ml  Cocaine                    150 ng/ml  Methadone                  200 ng/ml  Opiates                    100 ng/ml  Phencyclidine               25 ng/ml  THC                            50 ng/ml  Methamphetamine            500 ng/ml  Tricyclic Antidepressants  300 ng/ml  Oxycodone                  100 ng/ml  Propoxyphene               300 ng/ml  Buprenorphine               10 ng/ml    The normal value for all drugs tested is negative. This report includes unconfirmed screening results, with the cutoff values listed, to be used for medical treatment purposes only.  Unconfirmed results must not be used for non-medical purposes such as employment or legal testing.  Clinical consideration should be applied to any drug of abuse test, particularly when unconfirmed results are used.     CBC WITH AUTO DIFFERENTIAL - Abnormal; Notable for the following components:    Eosinophil % 0.0 (*)     Lymphocytes, Absolute 3.26 (*)     All other components within normal limits    HCG, SERUM, QUALITATIVE - Normal   LACTIC ACID, PLASMA - Normal   TSH - Normal   CK - Normal   MAGNESIUM - Normal   POCT GLUCOSE FINGERSTICK - Normal   COMPREHENSIVE METABOLIC PANEL    Narrative:     GFR Normal >60  Chronic Kidney Disease <60  Kidney Failure <15   ETHANOL   PROLACTIN   CBC AND DIFFERENTIAL    Narrative:     The following orders were created for panel order CBC & Differential.  Procedure                               Abnormality         Status                     ---------                               -----------         ------                     CBC Auto Differential[[id number]]        Abnormal            Final result                 Please view results for these tests on the individual orders.   EXTRA TUBES    Narrative:     The following orders were created for panel order Extra Tubes.  Procedure                               Abnormality         Status                     ---------                               -----------         ------                     Light Blue Top[[id number]]                                   Final result                 Please view results for these tests on the individual orders.   LIGHT BLUE TOP     Ct Head Without Contrast    Result Date: 9/12/2019  Narrative: CT head without contrast on  9/12/2019 CLINICAL INDICATION: Headaches, seizure like activity TECHNIQUE: Multiple axial images are obtained throughout the head without the administration of contrast. This exam was performed according to our departmental dose-optimization program, which includes automated exposure control, adjustment of the mA and/or kV according to patient size and/or use of iterative reconstruction technique. Total DLP is 861 mGy*cm. COMPARISON: None FINDINGS:   There is no hydrocephalus. There is no CT evidence of acute infarct. There is no hemorrhage. There are no abnormal extra-axial fluid collections. There is no mass, mass effect or midline shift. No bony abnormality is noted.      Impression: No acute intracranial abnormality. Electronically signed by:  Chavo Argueta  9/12/2019 11:30 PM CDT Workstation: [id number]    EKG September 12, 2019 at 2148 reveals normal sinus rhythm rate 68 bpm.  There is T wave flattening in aVL without T wave inversion.  No ST elevation or depression.  No evidence of acute ischemia.        MDM  Number of Diagnoses or Management Options  Seizure-like activity (CMS/HCC):      Amount and/or Complexity of Data Reviewed  Clinical lab tests: reviewed  Tests in the radiology section of CPT®: reviewed  Discuss the patient with other providers: yes    Patient Progress  Patient progress: stable    Patient had 2 episodes of seizure-like activity in the emergency department.  These did not appear to be classic seizures.  She changes position during the seizure.  She maintains her airway and breathing throughout.  Both upper extremities are usually involved however even though they are shaking they are not stiff.  She moves her legs into flexion during the episode.  She holds her eyelids closed tightly during the episode.  There is no loss of bowel or bladder control.  Evaluation including lactic acid, electrolytes, urinalysis are unremarkable.  Head CT reveals no acute abnormality.  Patient's tox screen is positive for THC.  She is recently been on some medications that can lower the seizure threshold.  Her stated seizure history is questionable as mother states she had 2 seizures about 6 years ago.  Unclear as to whether she was actually on medications for seizures.  After second episode family is very concerned.  Patient will be transferred to [facility] for further neurologic evaluation.    Final diagnoses:   Seizure-like activity (CMS/HCC)              Eder Mccurdy,   09/13/19 0213

## 2019-09-13 NOTE — ED NOTES
"Pt presents to ED via EMS with C/O seizure like activity. Pt reports she was lying in bed with her . Pt reports she felt \"different\" and felt her body get cold and nauseated, her hands began to draw up, then her body began to jerk. Pt reports she has hx of seizures, but hasn't had one in approx 6 years. Pt reports she was taken off the medications 6 years ago. Pt was diagnosed with mono, but is not currently being taking any medications for it.     Lola Galicia RN  09/12/19 [id number]    "

## 2019-09-18 ENCOUNTER — OFFICE VISIT (OUTPATIENT)
Dept: FAMILY MEDICINE CLINIC | Facility: CLINIC | Age: 39
End: 2019-09-18

## 2019-09-18 VITALS
OXYGEN SATURATION: 99 % | DIASTOLIC BLOOD PRESSURE: 78 MMHG | SYSTOLIC BLOOD PRESSURE: 102 MMHG | BODY MASS INDEX: 23.39 KG/M2 | WEIGHT: 132 LBS | HEIGHT: 63 IN | TEMPERATURE: 98.4 F | HEART RATE: 88 BPM

## 2019-09-18 DIAGNOSIS — D22.61 MELANOCYTIC NEVUS OF RIGHT SHOULDER: ICD-10-CM

## 2019-09-18 DIAGNOSIS — R56.9 SEIZURES (HCC): ICD-10-CM

## 2019-09-18 DIAGNOSIS — G93.31 POSTVIRAL FATIGUE SYNDROME: ICD-10-CM

## 2019-09-18 DIAGNOSIS — T74.32XS: Primary | ICD-10-CM

## 2019-09-18 DIAGNOSIS — R51.9 FREQUENT HEADACHES: ICD-10-CM

## 2019-09-18 PROCEDURE — 99214 OFFICE O/P EST MOD 30 MIN: CPT | Performed by: FAMILY MEDICINE

## 2019-09-18 RX ORDER — GABAPENTIN 300 MG/1
300 CAPSULE ORAL 3 TIMES DAILY
COMMUNITY
End: 2019-12-11

## 2019-09-18 RX ORDER — TIZANIDINE 4 MG/1
2 TABLET ORAL EVERY 8 HOURS PRN
COMMUNITY
End: 2019-09-18 | Stop reason: SDUPTHER

## 2019-09-18 RX ORDER — TIZANIDINE 4 MG/1
2 TABLET ORAL EVERY 8 HOURS PRN
Qty: 60 TABLET | Refills: 2 | Status: SHIPPED | OUTPATIENT
Start: 2019-09-18 | End: 2019-10-18

## 2019-09-18 RX ORDER — TOPIRAMATE 25 MG/1
25 CAPSULE, COATED PELLETS ORAL 2 TIMES DAILY
Qty: 60 CAPSULE | Refills: 2 | Status: SHIPPED | OUTPATIENT
Start: 2019-09-18 | End: 2019-10-18

## 2019-09-18 RX ORDER — TOPIRAMATE 25 MG/1
25 CAPSULE, COATED PELLETS ORAL 2 TIMES DAILY
COMMUNITY
End: 2019-09-18 | Stop reason: SDUPTHER

## 2019-09-18 RX ORDER — BUTALBITAL, ACETAMINOPHEN AND CAFFEINE 50; 325; 40 MG/1; MG/1; MG/1
1 TABLET ORAL EVERY 4 HOURS PRN
Qty: 30 TABLET | Refills: 0 | Status: SHIPPED | OUTPATIENT
Start: 2019-09-18 | End: 2020-01-14 | Stop reason: SDUPTHER
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
## 2019-09-27 ENCOUNTER — HOSPITAL ENCOUNTER (OUTPATIENT)
Facility: HOSPITAL | Age: 39
Setting detail: HOSPITAL OUTPATIENT SURGERY
End: 2019-09-27
Attending: INTERNAL MEDICINE | Admitting: INTERNAL MEDICINE

## 2019-10-01 ENCOUNTER — PROCEDURE VISIT (OUTPATIENT)
Dept: FAMILY MEDICINE CLINIC | Facility: CLINIC | Age: 39
End: 2019-10-01

## 2019-10-01 VITALS
WEIGHT: 131.56 LBS | OXYGEN SATURATION: 98 % | BODY MASS INDEX: 23.31 KG/M2 | DIASTOLIC BLOOD PRESSURE: 76 MMHG | TEMPERATURE: 97.6 F | SYSTOLIC BLOOD PRESSURE: 108 MMHG | HEIGHT: 63 IN | HEART RATE: 85 BPM

## 2019-10-01 DIAGNOSIS — L98.9 SKIN LESION OF BACK: Primary | ICD-10-CM

## 2019-10-01 PROCEDURE — 88305 TISSUE EXAM BY PATHOLOGIST: CPT | Performed by: STUDENT IN AN ORGANIZED HEALTH CARE EDUCATION/TRAINING PROGRAM

## 2019-10-01 PROCEDURE — 11102 TANGNTL BX SKIN SINGLE LES: CPT | Performed by: STUDENT IN AN ORGANIZED HEALTH CARE EDUCATION/TRAINING PROGRAM

## 2019-10-01 PROCEDURE — 88305 TISSUE EXAM BY PATHOLOGIST: CPT | Performed by: PATHOLOGY

## 2019-10-01 NOTE — PROGRESS NOTES
Procedure   Skin Biopsy  Date/Time: 10/1/2019 10:36 AM  Performed by: Debra Arias MD  Authorized by: Debra Arias MD       Consent was given by the patient. Informed consent was obtained through verbal and written consent. The risks of the procedure were discussed including but not limited to pain and bleeding. Alternatives including observation were discussed. Relevant documents were present and verified. The operative site/side was identified and marked. Immediately prior to the procedure, a time out was called. The patient's identity was confirmed with verbal confirmation with patient.     Procedure:  A shave biopsy was performed. (Upper back) The total number of sites biopsied: 1.   Lidocaine 1% with epinephrine was injected for anesthesia. 1.75 cc's were injected. Hemostasis was obtained with pressure and electrocautery. The site was closed with none. The specimen was sent for pathology. After the procedure, the site was to have no obvious complications and good hemostasis. The wound was treated with antibiotic ointment and a bandaid. The procedure was tolerated well with no immediate complications. Comments: Betadine was applied to the lesion and surrounding area. Excess betadine solution was removed with gauze. Lidocaine with epinephrine was injected under the lesion at multiple angles forming a wheal. The blade was used to excise the lesion in a horizontal fashion. Hemostasis was achieved with applied pressure and electrocautery. Antibiotic ointment and a bandaid were applied to the area of excision. The patient tolerated the procedure well.       Debra Arias M.D. PGY3  [facility]  [address] [place] [address]  Office: [phone]      This document has been electronically signed by Debra Arias MD on October 1, 2019 10:42 AM

## 2019-10-01 NOTE — PROGRESS NOTES
I have seen the patient.  I have reviewed the notes, assessments, and/or procedures performed by dr Arias, I concur with her/his documentation and assessment and plan for Anne-Marie Ellison.               This document has been electronically signed by Zachary Nagy MD on October 1, 2019 11:25 AM

## 2019-10-03 LAB
LAB AP CASE REPORT: NORMAL
PATH REPORT.FINAL DX SPEC: NORMAL
PATH REPORT.GROSS SPEC: NORMAL

## 2019-10-04 ENCOUNTER — CONSULT (OUTPATIENT)
Dept: SURGERY | Facility: CLINIC | Age: 39
End: 2019-10-04

## 2019-10-04 VITALS
DIASTOLIC BLOOD PRESSURE: 84 MMHG | TEMPERATURE: 97.9 F | HEIGHT: 63 IN | BODY MASS INDEX: 23.64 KG/M2 | WEIGHT: 133.4 LBS | HEART RATE: 51 BPM | SYSTOLIC BLOOD PRESSURE: 122 MMHG

## 2019-10-04 DIAGNOSIS — K64.5 PERIANAL VENOUS THROMBOSIS: Primary | ICD-10-CM

## 2019-10-04 PROCEDURE — 99203 OFFICE O/P NEW LOW 30 MIN: CPT | Performed by: SURGERY

## 2019-10-04 RX ORDER — SODIUM CHLORIDE, SODIUM LACTATE, POTASSIUM CHLORIDE, CALCIUM CHLORIDE 600; 310; 30; 20 MG/100ML; MG/100ML; MG/100ML; MG/100ML
100 INJECTION, SOLUTION INTRAVENOUS CONTINUOUS
Status: CANCELLED | OUTPATIENT
Start: 2019-10-07

## 2019-10-04 RX ORDER — LEVOFLOXACIN 5 MG/ML
500 INJECTION, SOLUTION INTRAVENOUS ONCE
Status: CANCELLED | OUTPATIENT
Start: 2019-10-07 | End: 2019-10-04

## 2019-10-04 NOTE — H&P (VIEW-ONLY)
Chief Complaint   Patient presents with   • Hemorrhoids     Ck possible Hemorrhoids        HPI  38 year old with a thrombosed hemorrhoid. No bleeding, Primary sx has been pain. No drainage. Area  Has been present for 3 weeks.  Past Medical History:   Diagnosis Date   • Migraine        Past Surgical History:   Procedure Laterality Date   • ANKLE SURGERY Right    • CHOLECYSTECTOMY     • HYSTERECTOMY     • WRIST SURGERY           Current Outpatient Medications:   •  B Complex Vitamins (B COMPLEX-B12 PO), Take  by mouth Daily., Disp: , Rfl:   •  cholecalciferol (VITAMIN D3) 98924 units capsule, Take 50,000 Units by mouth 1 (One) Time Per Week., Disp: , Rfl:   •  DULoxetine (CYMBALTA) 30 MG capsule, Take 1 capsule by mouth Daily., Disp: 30 capsule, Rfl: 2  •  gabapentin (NEURONTIN) 300 MG capsule, Take 300 mg by mouth 3 (Three) Times a Day., Disp: , Rfl:   •  linaCLOtide (LINZESS PO), Take  by mouth Daily., Disp: , Rfl:   •  pantoprazole (PROTONIX) 40 MG EC tablet, Take 40 mg by mouth Daily., Disp: , Rfl:   •  predniSONE (DELTASONE) 5 MG tablet, Take 1/2 tablet daily for 2 weeks then discontinue, Disp: 7 tablet, Rfl: 0  •  sucralfate (CARAFATE) 1 g tablet, Take 1 g by mouth 4 (Four) Times a Day., Disp: , Rfl:   •  tiZANidine (ZANAFLEX) 4 MG tablet, Take 0.5 tablets by mouth Every 8 (Eight) Hours As Needed for Muscle Spasms for up to 30 days., Disp: 60 tablet, Rfl: 2  •  topiramate (TOPAMAX) 25 MG capsule, Take 1 capsule by mouth 2 (Two) Times a Day for 30 days., Disp: 60 capsule, Rfl: 2  •  traZODone (DESYREL) 50 MG tablet, Take 1 tablet by mouth Every Night., Disp: 30 tablet, Rfl: 3  •  butalbital-acetaminophen-caffeine (ESGIC) -40 MG per tablet, Take 1 tablet by mouth Every 4 (Four) Hours As Needed for Headache (no more than 2 tabs per week)., Disp: 30 tablet, Rfl: 0    Allergies   Allergen Reactions   • Nsaids GI Intolerance     Pt has ulcers       Family History   Problem Relation Age of Onset   • Hypertension  Mother    • Heart disease Father    • COPD Father        Social History     Socioeconomic History   • Marital status:      Spouse name: Not on file   • Number of children: Not on file   • Years of education: Not on file   • Highest education level: Not on file   Tobacco Use   • Smoking status: Current Every Day Smoker     Packs/day: 0.25     Types: Cigarettes   • Smokeless tobacco: Never Used   Substance and Sexual Activity   • Alcohol use: Yes     Frequency: Never     Comment: socially   • Drug use: No       Review of Systems   Constitutional:        Weight loss   Eyes: Negative.    Respiratory: Negative.    Cardiovascular: Negative.    Gastrointestinal: Positive for constipation.        Hemorrhoids   Endocrine: Negative.    Genitourinary: Negative.    Musculoskeletal: Positive for back pain.   Neurological: Positive for dizziness, seizures and headaches.   Psychiatric/Behavioral: Negative.        Physical Exam   Constitutional: She is oriented to person, place, and time. She appears well-developed and well-nourished. No distress.   HENT:   Head: Normocephalic and atraumatic.   Mouth/Throat: No oropharyngeal exudate.   Eyes: EOM are normal. Pupils are equal, round, and reactive to light. No scleral icterus.   Neck: Normal range of motion. Neck supple. No JVD present. No tracheal deviation present. No thyromegaly present.   Cardiovascular: Normal rate, regular rhythm and normal heart sounds.   Pulmonary/Chest: Effort normal and breath sounds normal. No stridor. No respiratory distress. She has no wheezes. She has no rales.   Abdominal: Soft. Bowel sounds are normal. She exhibits no distension and no mass. There is no tenderness. There is no rebound and no guarding. No hernia.   Musculoskeletal: Normal range of motion. She exhibits no edema, tenderness or deformity.   Lymphadenopathy:     She has no cervical adenopathy.     She has no axillary adenopathy.   Neurological: She is alert and oriented to person,  place, and time.   Skin: Skin is warm and dry. No rash noted. She is not diaphoretic. No erythema. No pallor.   Psychiatric: She has a normal mood and affect. Her behavior is normal. Judgment normal.   Vitals reviewed.        ASSESSMENT    Anne-Marie was seen today for hemorrhoids.    Diagnoses and all orders for this visit:    Perianal venous thrombosis  -     lactated ringers infusion  -     metroNIDAZOLE (FLAGYL) 500 mg/100mL IVPB  -     Case Request; Standing  -     levoFLOXacin (LEVAQUIN) 500 mg in sodium chloride 0.9 % 150 mL IVPB  -     Case Request    Other orders  -     Follow Anesthesia Guidelines / Standing Orders; Future  -     Provide NPO Instructions to Patient; Future  -     Follow Anesthesia Guidelines / Standing Orders; Standing  -     Verify NPO Status; Standing  -     Obtain Informed Consent; Standing  -     SCD (Sequential Compression Device) - Place on Patient in Pre-Op; Standing  -     Verify/Perform Chlorhexidine Skin Prep; Standing        PLAN    1. Hemorrhoidectomy is planned    Procedure with the risks of bleeding, infection, open wounds, poor healing, recurrence. Understands and agrees. No good alternatives. Low tx risk.              This document has been electronically signed by El Lazcano MD on October 4, 2019 4:10 PM

## 2019-10-04 NOTE — PROGRESS NOTES
Chief Complaint   Patient presents with   • Hemorrhoids     Ck possible Hemorrhoids        HPI  38 year old with a thrombosed hemorrhoid. No bleeding, Primary sx has been pain. No drainage. Area  Has been present for 3 weeks.  Past Medical History:   Diagnosis Date   • Migraine        Past Surgical History:   Procedure Laterality Date   • ANKLE SURGERY Right    • CHOLECYSTECTOMY     • HYSTERECTOMY     • WRIST SURGERY           Current Outpatient Medications:   •  B Complex Vitamins (B COMPLEX-B12 PO), Take  by mouth Daily., Disp: , Rfl:   •  cholecalciferol (VITAMIN D3) 15416 units capsule, Take 50,000 Units by mouth 1 (One) Time Per Week., Disp: , Rfl:   •  DULoxetine (CYMBALTA) 30 MG capsule, Take 1 capsule by mouth Daily., Disp: 30 capsule, Rfl: 2  •  gabapentin (NEURONTIN) 300 MG capsule, Take 300 mg by mouth 3 (Three) Times a Day., Disp: , Rfl:   •  linaCLOtide (LINZESS PO), Take  by mouth Daily., Disp: , Rfl:   •  pantoprazole (PROTONIX) 40 MG EC tablet, Take 40 mg by mouth Daily., Disp: , Rfl:   •  predniSONE (DELTASONE) 5 MG tablet, Take 1/2 tablet daily for 2 weeks then discontinue, Disp: 7 tablet, Rfl: 0  •  sucralfate (CARAFATE) 1 g tablet, Take 1 g by mouth 4 (Four) Times a Day., Disp: , Rfl:   •  tiZANidine (ZANAFLEX) 4 MG tablet, Take 0.5 tablets by mouth Every 8 (Eight) Hours As Needed for Muscle Spasms for up to 30 days., Disp: 60 tablet, Rfl: 2  •  topiramate (TOPAMAX) 25 MG capsule, Take 1 capsule by mouth 2 (Two) Times a Day for 30 days., Disp: 60 capsule, Rfl: 2  •  traZODone (DESYREL) 50 MG tablet, Take 1 tablet by mouth Every Night., Disp: 30 tablet, Rfl: 3  •  butalbital-acetaminophen-caffeine (ESGIC) -40 MG per tablet, Take 1 tablet by mouth Every 4 (Four) Hours As Needed for Headache (no more than 2 tabs per week)., Disp: 30 tablet, Rfl: 0    Allergies   Allergen Reactions   • Nsaids GI Intolerance     Pt has ulcers       Family History   Problem Relation Age of Onset   • Hypertension  Mother    • Heart disease Father    • COPD Father        Social History     Socioeconomic History   • Marital status:      Spouse name: Not on file   • Number of children: Not on file   • Years of education: Not on file   • Highest education level: Not on file   Tobacco Use   • Smoking status: Current Every Day Smoker     Packs/day: 0.25     Types: Cigarettes   • Smokeless tobacco: Never Used   Substance and Sexual Activity   • Alcohol use: Yes     Frequency: Never     Comment: socially   • Drug use: No       Review of Systems   Constitutional:        Weight loss   Eyes: Negative.    Respiratory: Negative.    Cardiovascular: Negative.    Gastrointestinal: Positive for constipation.        Hemorrhoids   Endocrine: Negative.    Genitourinary: Negative.    Musculoskeletal: Positive for back pain.   Neurological: Positive for dizziness, seizures and headaches.   Psychiatric/Behavioral: Negative.        Physical Exam   Constitutional: She is oriented to person, place, and time. She appears well-developed and well-nourished. No distress.   HENT:   Head: Normocephalic and atraumatic.   Mouth/Throat: No oropharyngeal exudate.   Eyes: EOM are normal. Pupils are equal, round, and reactive to light. No scleral icterus.   Neck: Normal range of motion. Neck supple. No JVD present. No tracheal deviation present. No thyromegaly present.   Cardiovascular: Normal rate, regular rhythm and normal heart sounds.   Pulmonary/Chest: Effort normal and breath sounds normal. No stridor. No respiratory distress. She has no wheezes. She has no rales.   Abdominal: Soft. Bowel sounds are normal. She exhibits no distension and no mass. There is no tenderness. There is no rebound and no guarding. No hernia.   Musculoskeletal: Normal range of motion. She exhibits no edema, tenderness or deformity.   Lymphadenopathy:     She has no cervical adenopathy.     She has no axillary adenopathy.   Neurological: She is alert and oriented to person,  place, and time.   Skin: Skin is warm and dry. No rash noted. She is not diaphoretic. No erythema. No pallor.   Psychiatric: She has a normal mood and affect. Her behavior is normal. Judgment normal.   Vitals reviewed.        ASSESSMENT    Anne-Marie was seen today for hemorrhoids.    Diagnoses and all orders for this visit:    Perianal venous thrombosis  -     lactated ringers infusion  -     metroNIDAZOLE (FLAGYL) 500 mg/100mL IVPB  -     Case Request; Standing  -     levoFLOXacin (LEVAQUIN) 500 mg in sodium chloride 0.9 % 150 mL IVPB  -     Case Request    Other orders  -     Follow Anesthesia Guidelines / Standing Orders; Future  -     Provide NPO Instructions to Patient; Future  -     Follow Anesthesia Guidelines / Standing Orders; Standing  -     Verify NPO Status; Standing  -     Obtain Informed Consent; Standing  -     SCD (Sequential Compression Device) - Place on Patient in Pre-Op; Standing  -     Verify/Perform Chlorhexidine Skin Prep; Standing        PLAN    1. Hemorrhoidectomy is planned    Procedure with the risks of bleeding, infection, open wounds, poor healing, recurrence. Understands and agrees. No good alternatives. Low tx risk.              This document has been electronically signed by El Lazcano MD on October 4, 2019 4:10 PM

## 2019-10-04 NOTE — PATIENT INSTRUCTIONS

## 2019-10-07 ENCOUNTER — ANESTHESIA (OUTPATIENT)
Dept: PERIOP | Facility: HOSPITAL | Age: 39
End: 2019-10-07

## 2019-10-07 ENCOUNTER — HOSPITAL ENCOUNTER (OUTPATIENT)
Facility: HOSPITAL | Age: 39
Setting detail: HOSPITAL OUTPATIENT SURGERY
Discharge: HOME | End: 2019-10-07
Attending: SURGERY | Admitting: SURGERY

## 2019-10-07 ENCOUNTER — TELEPHONE (OUTPATIENT)
Dept: FAMILY MEDICINE CLINIC | Facility: CLINIC | Age: 39
End: 2019-10-07

## 2019-10-07 ENCOUNTER — ANESTHESIA EVENT (OUTPATIENT)
Dept: PERIOP | Facility: HOSPITAL | Age: 39
End: 2019-10-07

## 2019-10-07 VITALS
HEIGHT: 63 IN | OXYGEN SATURATION: 98 % | HEART RATE: 77 BPM | DIASTOLIC BLOOD PRESSURE: 74 MMHG | BODY MASS INDEX: 22.89 KG/M2 | SYSTOLIC BLOOD PRESSURE: 135 MMHG | RESPIRATION RATE: 18 BRPM | TEMPERATURE: 98.4 F | WEIGHT: 129.19 LBS

## 2019-10-07 DIAGNOSIS — K64.5 PERIANAL VENOUS THROMBOSIS: ICD-10-CM

## 2019-10-07 PROCEDURE — 25010000002 LEVOFLOXACIN PER 250 MG: Performed by: SURGERY

## 2019-10-07 PROCEDURE — C9290 INJ, BUPIVACAINE LIPOSOME: HCPCS | Performed by: SURGERY

## 2019-10-07 PROCEDURE — 25010000002 ONDANSETRON PER 1 MG: Performed by: NURSE ANESTHETIST, CERTIFIED REGISTERED

## 2019-10-07 PROCEDURE — 46255 REMOVE INT/EXT HEM 1 GROUP: CPT | Performed by: SURGERY

## 2019-10-07 PROCEDURE — 25010000003 BUPIVACAINE LIPOSOME 1.3 % SUSPENSION: Performed by: SURGERY

## 2019-10-07 PROCEDURE — 25010000002 PROPOFOL 10 MG/ML EMULSION: Performed by: NURSE ANESTHETIST, CERTIFIED REGISTERED

## 2019-10-07 PROCEDURE — 25010000002 DEXAMETHASONE PER 1 MG: Performed by: NURSE ANESTHETIST, CERTIFIED REGISTERED

## 2019-10-07 PROCEDURE — 25010000002 KETOROLAC TROMETHAMINE PER 15 MG: Performed by: NURSE ANESTHETIST, CERTIFIED REGISTERED

## 2019-10-07 PROCEDURE — 88304 TISSUE EXAM BY PATHOLOGIST: CPT | Performed by: SURGERY

## 2019-10-07 PROCEDURE — 88304 TISSUE EXAM BY PATHOLOGIST: CPT | Performed by: PATHOLOGY

## 2019-10-07 PROCEDURE — 25010000002 MIDAZOLAM PER 1 MG: Performed by: NURSE ANESTHETIST, CERTIFIED REGISTERED

## 2019-10-07 PROCEDURE — 25010000002 FENTANYL CITRATE (PF) 100 MCG/2ML SOLUTION: Performed by: NURSE ANESTHETIST, CERTIFIED REGISTERED

## 2019-10-07 RX ORDER — ONDANSETRON 2 MG/ML
4 INJECTION INTRAMUSCULAR; INTRAVENOUS ONCE AS NEEDED
Status: DISCONTINUED | OUTPATIENT
Start: 2019-10-07 | End: 2019-10-07 | Stop reason: HOSPADM

## 2019-10-07 RX ORDER — LIDOCAINE 50 MG/G
OINTMENT TOPICAL AS NEEDED
Status: DISCONTINUED | OUTPATIENT
Start: 2019-10-07 | End: 2019-10-07 | Stop reason: HOSPADM

## 2019-10-07 RX ORDER — PANTOPRAZOLE SODIUM 40 MG/1
40 TABLET, DELAYED RELEASE ORAL DAILY
Qty: 30 TABLET | Refills: 3 | Status: SHIPPED | OUTPATIENT
Start: 2019-10-07 | End: 2020-02-04 | Stop reason: SDUPTHER

## 2019-10-07 RX ORDER — LEVOFLOXACIN 5 MG/ML
500 INJECTION, SOLUTION INTRAVENOUS ONCE
Status: COMPLETED | OUTPATIENT
Start: 2019-10-07 | End: 2019-10-07

## 2019-10-07 RX ORDER — HYDROCODONE BITARTRATE AND ACETAMINOPHEN 7.5; 325 MG/1; MG/1
1 TABLET ORAL EVERY 4 HOURS PRN
Qty: 20 TABLET | Refills: 0 | Status: SHIPPED | OUTPATIENT
Start: 2019-10-07 | End: 2019-10-12

## 2019-10-07 RX ORDER — HYDROCODONE BITARTRATE AND ACETAMINOPHEN 7.5; 325 MG/1; MG/1
1 TABLET ORAL EVERY 6 HOURS PRN
Qty: 20 TABLET | Refills: 0 | Status: SHIPPED | OUTPATIENT
Start: 2019-10-07 | End: 2019-10-14 | Stop reason: SDUPTHER

## 2019-10-07 RX ORDER — ONDANSETRON 2 MG/ML
4 INJECTION INTRAMUSCULAR; INTRAVENOUS ONCE AS NEEDED
Status: DISCONTINUED | OUTPATIENT
Start: 2019-10-07 | End: 2019-10-07 | Stop reason: SDUPTHER

## 2019-10-07 RX ORDER — LIDOCAINE HYDROCHLORIDE 20 MG/ML
INJECTION, SOLUTION INFILTRATION; PERINEURAL AS NEEDED
Status: DISCONTINUED | OUTPATIENT
Start: 2019-10-07 | End: 2019-10-07 | Stop reason: SURG

## 2019-10-07 RX ORDER — PROPOFOL 10 MG/ML
VIAL (ML) INTRAVENOUS AS NEEDED
Status: DISCONTINUED | OUTPATIENT
Start: 2019-10-07 | End: 2019-10-07 | Stop reason: SURG

## 2019-10-07 RX ORDER — MIDAZOLAM HYDROCHLORIDE 1 MG/ML
INJECTION INTRAMUSCULAR; INTRAVENOUS AS NEEDED
Status: DISCONTINUED | OUTPATIENT
Start: 2019-10-07 | End: 2019-10-07 | Stop reason: SURG

## 2019-10-07 RX ORDER — SODIUM CHLORIDE, SODIUM LACTATE, POTASSIUM CHLORIDE, CALCIUM CHLORIDE 600; 310; 30; 20 MG/100ML; MG/100ML; MG/100ML; MG/100ML
100 INJECTION, SOLUTION INTRAVENOUS CONTINUOUS
Status: DISCONTINUED | OUTPATIENT
Start: 2019-10-07 | End: 2019-10-07 | Stop reason: HOSPADM

## 2019-10-07 RX ORDER — DEXAMETHASONE SODIUM PHOSPHATE 4 MG/ML
INJECTION, SOLUTION INTRA-ARTICULAR; INTRALESIONAL; INTRAMUSCULAR; INTRAVENOUS; SOFT TISSUE AS NEEDED
Status: DISCONTINUED | OUTPATIENT
Start: 2019-10-07 | End: 2019-10-07 | Stop reason: SURG

## 2019-10-07 RX ORDER — ONDANSETRON 2 MG/ML
INJECTION INTRAMUSCULAR; INTRAVENOUS AS NEEDED
Status: DISCONTINUED | OUTPATIENT
Start: 2019-10-07 | End: 2019-10-07 | Stop reason: SURG

## 2019-10-07 RX ORDER — FENTANYL CITRATE 50 UG/ML
INJECTION, SOLUTION INTRAMUSCULAR; INTRAVENOUS AS NEEDED
Status: DISCONTINUED | OUTPATIENT
Start: 2019-10-07 | End: 2019-10-07 | Stop reason: SURG

## 2019-10-07 RX ORDER — HYDROCODONE BITARTRATE AND ACETAMINOPHEN 7.5; 325 MG/1; MG/1
1 TABLET ORAL ONCE AS NEEDED
Status: COMPLETED | OUTPATIENT
Start: 2019-10-07 | End: 2019-10-07

## 2019-10-07 RX ORDER — KETOROLAC TROMETHAMINE 30 MG/ML
INJECTION, SOLUTION INTRAMUSCULAR; INTRAVENOUS AS NEEDED
Status: DISCONTINUED | OUTPATIENT
Start: 2019-10-07 | End: 2019-10-07 | Stop reason: SURG

## 2019-10-07 RX ADMIN — METRONIDAZOLE 500 MG: 500 INJECTION, SOLUTION INTRAVENOUS at 16:47

## 2019-10-07 RX ADMIN — HYDROCODONE BITARTRATE AND ACETAMINOPHEN 1 TABLET: 7.5; 325 TABLET ORAL at 18:43

## 2019-10-07 RX ADMIN — KETOROLAC TROMETHAMINE 30 MG: 30 INJECTION, SOLUTION INTRAMUSCULAR at 17:21

## 2019-10-07 RX ADMIN — LIDOCAINE HYDROCHLORIDE 60 MG: 20 INJECTION, SOLUTION INFILTRATION; PERINEURAL at 16:49

## 2019-10-07 RX ADMIN — FENTANYL CITRATE 25 MCG: 50 INJECTION, SOLUTION INTRAMUSCULAR; INTRAVENOUS at 16:47

## 2019-10-07 RX ADMIN — FENTANYL CITRATE 25 MCG: 50 INJECTION, SOLUTION INTRAMUSCULAR; INTRAVENOUS at 16:58

## 2019-10-07 RX ADMIN — LEVOFLOXACIN 500 MG: 5 INJECTION, SOLUTION INTRAVENOUS at 17:16

## 2019-10-07 RX ADMIN — DEXAMETHASONE SODIUM PHOSPHATE 4 MG: 4 INJECTION, SOLUTION INTRAMUSCULAR; INTRAVENOUS at 17:00

## 2019-10-07 RX ADMIN — MIDAZOLAM HYDROCHLORIDE 2 MG: 2 INJECTION, SOLUTION INTRAMUSCULAR; INTRAVENOUS at 16:44

## 2019-10-07 RX ADMIN — FENTANYL CITRATE 50 MCG: 50 INJECTION, SOLUTION INTRAMUSCULAR; INTRAVENOUS at 17:05

## 2019-10-07 RX ADMIN — FENTANYL CITRATE 50 MCG: 50 INJECTION, SOLUTION INTRAMUSCULAR; INTRAVENOUS at 17:16

## 2019-10-07 RX ADMIN — ONDANSETRON 4 MG: 2 INJECTION INTRAMUSCULAR; INTRAVENOUS at 17:00

## 2019-10-07 RX ADMIN — PROPOFOL 150 MG: 10 INJECTION, EMULSION INTRAVENOUS at 16:49

## 2019-10-07 RX ADMIN — SODIUM CHLORIDE, POTASSIUM CHLORIDE, SODIUM LACTATE AND CALCIUM CHLORIDE 100 ML/HR: 600; 310; 30; 20 INJECTION, SOLUTION INTRAVENOUS at 15:33

## 2019-10-07 NOTE — ANESTHESIA PREPROCEDURE EVALUATION
Anesthesia Evaluation     no history of anesthetic complications:  NPO Solid Status: > 8 hours  NPO Liquid Status: > 4 hours           Airway   Mallampati: II  TM distance: <3 FB  Neck ROM: limited  Difficult intubation highly probable and Small opening  Dental    (+) upper dentures and poor dentition    Pulmonary - normal exam    breath sounds clear to auscultation  (+) a smoker (1 ppd) Current Smoked day of surgery,   (-) COPD, asthma, sleep apnea  Cardiovascular - negative cardio ROS and normal exam  Exercise tolerance: good (4-7 METS)    ECG reviewed  Rhythm: regular  Rate: normal    (-) hypertension, valvular problems/murmurs, dysrhythmias, angina, cardiac stents, DVT, hyperlipidemia    ROS comment: Normal sinus rhythm  Possible Left atrial enlargement  Borderline ECG    Neuro/Psych  (+) seizures (last event 3 weeks ago but none for 3 yrs before that), headaches (migraines and current HA daily),     (-) TIA, CVA, psychiatric history  GI/Hepatic/Renal/Endo    (+)  GERD well controlled, PUD,    (-) hepatitis, liver disease, no renal disease, diabetes, hypothyroidism    Musculoskeletal (-) negative ROS    Abdominal    Substance History   (-) alcohol use, drug use     OB/GYN    (-)  Pregnant        Other        (-) arthritis, history of cancer  ROS/Med Hx Other: MONO                  Anesthesia Plan    ASA 3     general   ([facility])  intravenous induction   Anesthetic plan, all risks, benefits, and alternatives have been provided, discussed and informed consent has been obtained with: patient and spouse/significant other.       OB Abstraction Encounter

## 2019-10-07 NOTE — ANESTHESIA PROCEDURE NOTES
Airway  Urgency: elective    Date/Time: 10/7/2019 4:51 PM  Airway not difficult    General Information and Staff    Patient location during procedure: OR  CRNA: Yen Hanna CRNA    Indications and Patient Condition  Indications for airway management: airway protection    Preoxygenated: yes  Mask difficulty assessment: 0 - not attempted    Final Airway Details  Final airway type: supraglottic airway      Successful airway: I-gel  Size 4    Number of attempts at approach: 1  Assessment: lips, teeth, and gum same as pre-op and atraumatic intubation

## 2019-10-07 NOTE — INTERVAL H&P NOTE
H&P reviewed. The patient was examined and there are no changes to the H&P.      Temp:  [97.9 °F (36.6 °C)] 97.9 °F (36.6 °C)  Heart Rate:  [95] 95  Resp:  [18] 18  BP: (122)/(71) 122/71

## 2019-10-07 NOTE — TELEPHONE ENCOUNTER
Patient called asking for a refill on her pantoprazole (PROTONIX) 40 MG EC tablet. Patient uses [facility] in [place].     Thanks,  Zoë

## 2019-10-07 NOTE — ANESTHESIA POSTPROCEDURE EVALUATION
Patient: Anne-Marie Ellison    Procedure Summary     Date:  10/07/19 Room / Location:  [facility] OR 09 / [facility] OR    Anesthesia Start:  1646 Anesthesia Stop:  1728    Procedure:  HEMORRHOIDECTOMY (Left Anus) Diagnosis:       Perianal venous thrombosis      (Perianal venous thrombosis [K64.5])    Surgeon:  El Lazcano MD Provider:  Malick Bowser MD    Anesthesia Type:  general ASA Status:  3          Anesthesia Type: general  Last vitals  BP   122/71 (10/07/19 1517)   Temp   97.9 °F (36.6 °C) (10/07/19 1517)   Pulse   95 (10/07/19 1517)   Resp   18 (10/07/19 1517)     SpO2   96 % (10/07/19 1517)     Post Anesthesia Care and Evaluation    Patient location during evaluation: PACU  Patient participation: waiting for patient participation  Level of consciousness: sleepy but conscious  Pain score: 0  Pain management: adequate  Airway patency: patent  Anesthetic complications: No anesthetic complications  PONV Status: none  Cardiovascular status: acceptable  Respiratory status: acceptable, spontaneous ventilation and oral airway  Hydration status: acceptable

## 2019-10-07 NOTE — OP NOTE
HEMORRHOIDECTOMY  Procedure Note    Anne-Marie Ellison  10/7/2019    Pre-op Diagnosis:   Perianal venous thrombosis [K64.5]    Post-op Diagnosis:     Thrombosed hemorrhoid on the left side of the anus    Procedure:  HEMORRHOIDECTOMY (single group)    Surgeon(s):  El Lazcano MD    Anesthesia: General    Staff:   Circulator: Priya Gasca RN; Carmen Felix RN  Scrub Person: Linda Dunn  Assistant: Virginia Lopez CSA    Estimated Blood Loss: minimal    Specimens:                ID Type Source Tests Collected by Time   A : hemorrhoid Tissue Hemorrhoid(s) TISSUE PATHOLOGY EXAM El Lazcano MD 10/7/2019 1711         Drains: None    Findings: Single thrombosed hemorrhoid; examination under anesthesia demonstrating no higher up perianal masses to the length of my finger    Complications: None    Indications: 38 year old woman with a history of painful thrombosed hemorrhoid on the left side of several weeks duration. Taken to surgery for open hemorrhoidectomy examination under anesthesia.    Description of procedure: The patient was brought to the operating room and placed supine on the operating table. After adequate general endotracheal anesthesia, she was placed in the left side down right side up position laterally on the operating table. The perianal area was prepped with Betadine and draped. Briefing and timeout were performed and all parties were in agreement.    Exam under anesthesia demonstrated only the single thrombosed hemorrhoid on the left side of the anus.  Examination to the length of my finger did not demonstrate any evidence of mass up higher.    The single area on the left side was suture ligated at its base with 2-0 chromic suture with a large Tory-Eaton retractor in place so as not to stenose the rectum.  The hemorrhoid was then excised with electrocautery.  Bleeding was well controlled.  The mucosal opening was closed with a locking running 2-0 chromic suture.    The wound was  packed lightly with Gelfoam and the anal area anesthesized with liposomal Marcaine.    The procedure was terminated, it was tolerated well. Sponge and needle counts were correct. The patient was returned to recovery in satisfactory condition.            This document has been electronically signed by El Lazcano MD on October 7, 2019 5:37 PM      Date: 10/7/2019  Time: 5:37 PM

## 2019-10-07 NOTE — DISCHARGE INSTRUCTIONS
Dr. El Lazcano  [address] [place] [address]  [phone] (office)  [phone] (hospital)  [phone] (cell)    Discharge Instructions for hemorrhoid surgery      1. Go home, rest and take it easy today; however, you should get up and move about several times today to reduce the risk of developing a clot in your legs.    2. You may experience some dizziness or memory loss from the anesthesia.  This may last for the next 24 hours. Someone should plan on staying with you for the first 24 hours for your safety.  3. Do not make any important legal decisions or sign any legal papers for the next 24 hours.    4. Eat and drink lightly today.  Start off with liquids, jello, soup, crackers or other bland foods at first. Please drink plenty of fluids. You may advance your diet tomorrow as tolerated as long as you do not experience any nausea or vomiting.   5. You should use ice to the anal area today and begin your sitz baths tomorrow.  6. The best method of pain relief is a sitz bath (sitting in a tub or warm water) at least 3 times daily for 15 minutes.  This helps to reduce pain and aids with hygiene/drainage.  The drainage may have an unpleasant odor.  This is not unexpected and should be controlled with baths and showers.  If the skin around the anal area becomes irritated, you may apply Vaseline, A&D ointment or a similar barrier cream to the area.     7. Bleeding and drainage are to be expected and may persist for as long as 2-4 weeks. Bleeding may occur with your bowel movements as well. Wear a cotton liner such as a Kotex pad or panty liner inside your underwear to protect your clothing.    8. Use some type of flushable, moistened wipe instead of bathroom tissue to keep the anal area clean after your bowel movements. It may be necessary to cleanse yourself in the bathtub or shower following your bowel movements.  9. You have received a prescription for a narcotic pain medicine, as you will have pain  following surgery.   You will not be totally pain free, but your pain medicine should make the pain tolerable.  Please take your pain medicine as prescribed and always take your pills with food to prevent nausea. Your pain may persist for 1-3 weeks. If you are having severe pain that cannot be controlled by the pain medicine, please contact me. Typically, patients with anal fissures will have less pain than those with hemorrhoids.    10. The goal is for your bowel movements to be soft which will help to minimize pain.  The pain medicine used to keep your comfortable may also cause some constipation so I recommend the following:    • Stool softener of choice  • Citrucel powder 1 tablespoon in 8oz of water daily  • Miralax daily if Colace and Citrucel do not results in soft bowel movements.    • Contact me if the above does not result in soft bowel movements as you may need to add to the regimen.      11. You have also received a prescription for an anti-nausea medicine.  Please take this as prescribed for any nausea or vomiting.  Nausea could be a result of the anesthesia or a result of the narcotic pain medicine.  If you experience severe nausea and vomiting that cannot be controlled by the nausea medicine, please call me.    12. No driving for 24 hours and for as long as you are taking your prescription pain medicine. You may resume your activities gradually.     13. If an appointment is not given to you before discharge, you will need to call the office at [phone] to schedule a follow-up appointment in 5-7 days.   14. Remember to contact me for any of the following:    • Fever > 101 degrees  • Severe pain that cannot be controlled by taking your pain pills  • Severe nausea or vomiting that cannot be controlled by taking your nausea pills  • Significant bleeding   • Any other questions or concerns      CALL OFFICE TOMORROW FOR FOLLOW-UP IN ONE WEEK WITH DR. RG.

## 2019-10-09 LAB
LAB AP CASE REPORT: NORMAL
PATH REPORT.FINAL DX SPEC: NORMAL
PATH REPORT.GROSS SPEC: NORMAL

## 2019-10-14 ENCOUNTER — OFFICE VISIT (OUTPATIENT)
Dept: SURGERY | Facility: CLINIC | Age: 39
End: 2019-10-14

## 2019-10-14 VITALS
TEMPERATURE: 98.6 F | HEART RATE: 113 BPM | DIASTOLIC BLOOD PRESSURE: 70 MMHG | HEIGHT: 63 IN | BODY MASS INDEX: 22.96 KG/M2 | SYSTOLIC BLOOD PRESSURE: 130 MMHG | WEIGHT: 129.6 LBS

## 2019-10-14 DIAGNOSIS — Z87.19 S/P HEMORRHOIDECTOMY: Primary | ICD-10-CM

## 2019-10-14 DIAGNOSIS — Z98.890 S/P HEMORRHOIDECTOMY: Primary | ICD-10-CM

## 2019-10-14 PROCEDURE — 99024 POSTOP FOLLOW-UP VISIT: CPT | Performed by: SURGERY

## 2019-10-14 RX ORDER — HYDROCODONE BITARTRATE AND ACETAMINOPHEN 7.5; 325 MG/1; MG/1
1 TABLET ORAL EVERY 6 HOURS PRN
Qty: 20 TABLET | Refills: 0 | Status: SHIPPED | OUTPATIENT
Start: 2019-10-14 | End: 2019-11-07

## 2019-10-14 NOTE — PROGRESS NOTES
CHIEF COMPLAINT:   Chief Complaint   Patient presents with   • Post-op Follow-up     Post operative hemorrhoidectomy 10-7-19       HPI: This patient is seen for a post-operatively following hemorrhoidectomy  Patient  is doing well. Eating well without any significant nausea. Having good bowel function. No problems with constipation or diarrhea. No urinary complaints. Denies fever. Ambulating well and slowly returning to normal activities.    PATHOLOGY:   Final Diagnosis   ANUS:   EXTERNAL HEMORRHOIDS WITH ORGANIZING THROMBI. Electronically signed by Ernesto Mac MD on 10/9/2019 at 1328    PHYSICAL EXAM:    Rectum: Incisions are healing well without any erythema or signs of infection.    ASSESSMENT    Anne-Marie was seen today for post-op follow-up.    Diagnoses and all orders for this visit:    S/P hemorrhoidectomy        PLAN:    1. The patient will follow-up on a prn basis unless there are any problems.  2. May shower.   3. Gradually return to normal activity without restrictions.          This document has been electronically signed by El Lazcano MD on October 14, 2019 11:11 AM

## 2019-10-14 NOTE — PATIENT INSTRUCTIONS

## 2019-11-01 ENCOUNTER — OFFICE VISIT (OUTPATIENT)
Dept: SURGERY | Facility: CLINIC | Age: 39
End: 2019-11-01

## 2019-11-01 VITALS
BODY MASS INDEX: 23.71 KG/M2 | WEIGHT: 133.8 LBS | DIASTOLIC BLOOD PRESSURE: 82 MMHG | HEART RATE: 101 BPM | HEIGHT: 63 IN | SYSTOLIC BLOOD PRESSURE: 118 MMHG | TEMPERATURE: 98 F

## 2019-11-01 DIAGNOSIS — Z98.890 STATUS POST HEMORRHOIDECTOMY: Primary | ICD-10-CM

## 2019-11-01 DIAGNOSIS — Z87.19 STATUS POST HEMORRHOIDECTOMY: Primary | ICD-10-CM

## 2019-11-01 PROCEDURE — 99024 POSTOP FOLLOW-UP VISIT: CPT | Performed by: SURGERY

## 2019-11-01 RX ORDER — DEXTROSE AND SODIUM CHLORIDE 5; .45 G/100ML; G/100ML
30 INJECTION, SOLUTION INTRAVENOUS CONTINUOUS PRN
Status: CANCELLED | OUTPATIENT
Start: 2019-11-04

## 2019-11-01 NOTE — PATIENT INSTRUCTIONS

## 2019-11-03 NOTE — PROGRESS NOTES
CHIEF COMPLAINT:   Chief Complaint   Patient presents with   • Follow-up     Recheck Hemorrhoids       HPI: This patient is seen for a post-operatively following hemorrhoidectomy patient  is doing well. Eating well without any significant nausea. Having good bowel function. No problems with constipation or diarrhea. No urinary complaints. Denies fever. Ambulating well and slowly returning to normal activities.    PATHOLOGY:   Final Diagnosis   ANUS:  EXTERNAL HEMORRHOIDS WITH ORGANIZING THROMBI.   Electronically signed by Ernesto Mac MD on 10/9/2019 at 1328       PHYSICAL EXAM:    Rectum: Incisions are healing well without any erythema or signs of infection.    ASSESSMENT    Anne-Marie was seen today for follow-up.    Diagnoses and all orders for this visit:    Status post hemorrhoidectomy        PLAN:    1. The patient will follow-up on a prn basis unless there are any problems.  2. May shower.   3. Gradually return to normal activity without restrictions.          This document has been electronically signed by El Lazcano MD on November 3, 2019 3:58 PM

## 2019-11-04 ENCOUNTER — ANESTHESIA (OUTPATIENT)
Dept: GASTROENTEROLOGY | Facility: HOSPITAL | Age: 39
End: 2019-11-04

## 2019-11-04 ENCOUNTER — ANESTHESIA EVENT (OUTPATIENT)
Dept: GASTROENTEROLOGY | Facility: HOSPITAL | Age: 39
End: 2019-11-04

## 2019-11-07 ENCOUNTER — OFFICE VISIT (OUTPATIENT)
Dept: FAMILY MEDICINE CLINIC | Facility: CLINIC | Age: 39
End: 2019-11-07

## 2019-11-07 VITALS
HEIGHT: 63 IN | WEIGHT: 129 LBS | SYSTOLIC BLOOD PRESSURE: 122 MMHG | DIASTOLIC BLOOD PRESSURE: 90 MMHG | HEART RATE: 92 BPM | OXYGEN SATURATION: 99 % | TEMPERATURE: 98.2 F | BODY MASS INDEX: 22.86 KG/M2

## 2019-11-07 DIAGNOSIS — Z87.19 S/P HEMORRHOIDECTOMY: ICD-10-CM

## 2019-11-07 DIAGNOSIS — R51.9 FREQUENT HEADACHES: ICD-10-CM

## 2019-11-07 DIAGNOSIS — G93.31 POSTVIRAL FATIGUE SYNDROME: Primary | ICD-10-CM

## 2019-11-07 DIAGNOSIS — Z98.890 S/P HEMORRHOIDECTOMY: ICD-10-CM

## 2019-11-07 DIAGNOSIS — M25.50 POLYARTHRALGIA: ICD-10-CM

## 2019-11-07 PROCEDURE — 99214 OFFICE O/P EST MOD 30 MIN: CPT | Performed by: FAMILY MEDICINE

## 2019-11-07 RX ORDER — MODAFINIL 200 MG/1
200 TABLET ORAL DAILY
Qty: 30 TABLET | Refills: 0 | Status: SHIPPED | OUTPATIENT
Start: 2019-11-07 | End: 2019-12-11 | Stop reason: CLARIF

## 2019-11-07 NOTE — PROGRESS NOTES
"  Subjective:     Anne-Marie Ellison is a 38 y.o. 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  She did undergo hemorrhoidectomy about 3 weeks ago and according to her  for the past 10 to 14 days she has been more or less bedbound with decreased energy levels and night sweats well as generalized pain she states that she will be in bed for several days then be up and about then back in bed she complains of extreme fatigue and drowsiness sleeps well at night but still feels groggy and hung over the next morning.  Patient did see a rheumatologist in [place] who confirmed that she had post viral syndrome and apparently ordered some confirmatory labs but did not offer any further treatment other than recommending avoidance of caffeinated beverages.  Patient states that she had been doing reasonably well up until her recent procedure which was uneventful however she has felt extremely fatigued ever since she underwent the hemorrhoid surgery which is otherwise successful without any complications she does report emotional lability and difficulty with word finding stating \"I search for words all the time\" she complains of extreme fatigue along with the polymyalgias         Past Medical Hx:  Past Medical History:   Diagnosis Date   • Migraine    • Seizures (CMS/HCC) 09/13/2019       Past Surgical Hx:  Past Surgical History:   Procedure Laterality Date   • ANKLE SURGERY Right    • CHOLECYSTECTOMY     • HEMORRHOIDECTOMY Left 10/7/2019    Procedure: HEMORRHOIDECTOMY;  Surgeon: El Lazcano MD;  Location: [facility];  Service: General   • " HYSTERECTOMY     • WRIST SURGERY         Health Maintenance:  Health Maintenance   Topic Date Due   • ANNUAL PHYSICAL  12/07/1982   • PNEUMOCOCCAL VACCINE (19-64 MEDIUM RISK) (1 of 1 - PPSV23) 12/07/1998   • TDAP/TD VACCINES (1 - Tdap) 12/07/1998   • INFLUENZA VACCINE  08/01/2019       Current Meds:    Current Outpatient Medications:   •  B Complex Vitamins (B COMPLEX-B12 PO), Take  by mouth Daily., Disp: , Rfl:   •  butalbital-acetaminophen-caffeine (ESGIC) -40 MG per tablet, Take 1 tablet by mouth Every 4 (Four) Hours As Needed for Headache (no more than 2 tabs per week)., Disp: 30 tablet, Rfl: 0  •  cholecalciferol (VITAMIN D3) 74544 units capsule, Take 50,000 Units by mouth 1 (One) Time Per Week., Disp: , Rfl:   •  DULoxetine (CYMBALTA) 30 MG capsule, Take 1 capsule by mouth Daily., Disp: 30 capsule, Rfl: 2  •  gabapentin (NEURONTIN) 300 MG capsule, Take 300 mg by mouth 3 (Three) Times a Day., Disp: , Rfl:   •  linaCLOtide (LINZESS PO), Take  by mouth Daily., Disp: , Rfl:   •  pantoprazole (PROTONIX) 40 MG EC tablet, Take 1 tablet by mouth Daily., Disp: 30 tablet, Rfl: 3  •  sucralfate (CARAFATE) 1 g tablet, Take 1 g by mouth 4 (Four) Times a Day., Disp: , Rfl:   •  traZODone (DESYREL) 50 MG tablet, Take 1 tablet by mouth Every Night., Disp: 30 tablet, Rfl: 3  •  modafinil (PROVIGIL) 200 MG tablet, Take 1 tablet by mouth Daily., Disp: 30 tablet, Rfl: 0    Allergies:  Nsaids    Family Hx:  Family History   Problem Relation Age of Onset   • Hypertension Mother    • Heart disease Father    • COPD Father         Social History:  Social History     Socioeconomic History   • Marital status:      Spouse name: Not on file   • Number of children: Not on file   • Years of education: Not on file   • Highest education level: Not on file   Tobacco Use   • Smoking status: Current Every Day Smoker     Packs/day: 1.00     Types: Cigarettes   • Smokeless tobacco: Never Used   Substance and Sexual Activity   •  "Alcohol use: No     Frequency: Never   • Drug use: No       Review of Systems  Review of Systems as per HPI negative for rash no documented fevers but she does have subjective fevers with generalized pain and night sweats no weight loss reported    Objective:     /90 (BP Location: Left arm)   Pulse 92   Temp 98.2 °F (36.8 °C)   Ht 160 cm (62.99\")   Wt 58.5 kg (129 lb)   SpO2 99%   BMI 22.86 kg/m²   Physical Exam   Alert no distress affect appropriate patient appears to be well-nourished HEENT grossly normal without asymmetry nares patent without flaring or discharge neck supple without JVD bruits adenopathy or thyromegaly lungs clear to auscultation and percussion without wheezes rales rhonchi tactile fremitus or pleural friction rub heart regular without murmur rub gallop or extrasystole PMI diffuse abdomen soft nontender no guarding or rebound extremities show no clubbing cyanosis edema or spasticity no regional adenopathy noted dermatologic exam shows no evidence of rash or stigmata neuro nonfocal no ataxia    Lab Review  Results for orders placed or performed during the hospital encounter of 10/07/19   Tissue Pathology Exam   Result Value Ref Range    Case Report       Surgical Pathology Report                         Case: [id number]                                  Authorizing Provider:  El Lazcano MD           Collected:           10/07/2019 05:11 PM          Ordering Location:     [facility]             Received:            10/08/2019 07:13 AM                                 [place] [facility]                                                              Pathologist:           Ernesto Mac MD                                                         Specimen:    Hemorrhoid(s), hemorrhoid                                                                  Final Diagnosis       ANUS:  EXTERNAL HEMORRHOIDS WITH ORGANIZING THROMBI.      Gross Description       The container is labeled " "\"hemorrhoids\" and has a gray-tan mucosal mass measuring 1.5 x 0.9 x 0.5 cm.  The cut surfaces are heavily congested.  The entire specimen is embedded as 1A.              Assessment:     Anne-Marie was seen today for abdominal pain, fatigue, pain and sore throat.    Diagnoses and all orders for this visit:    Postviral fatigue syndrome    Polyarthralgia    Frequent headaches    S/P hemorrhoidectomy    Other orders  -     modafinil (PROVIGIL) 200 MG tablet; Take 1 tablet by mouth Daily.        Plan:   Trial of wagner 200 mg daily patient will likely need prior authorization hopefully this will help with her fatigue as well as somnolence alternative agent could include atomoxetine patient is already restricting her caffeine intake we will follow-up in 1 month hopefully this will only be a temporary treatment otherwise encourage the patient to stay as active as possible throughout the day continue current regimen otherwise recheck as needed Anne-Marie Nancy Scot.            This document has been electronically signed by Zachary Nagy MD on November 7, 2019 4:36 PM    "

## 2019-11-08 PROBLEM — G47.419 PRIMARY NARCOLEPSY WITHOUT CATAPLEXY: Status: ACTIVE | Noted: 2019-11-08

## 2019-11-08 PROBLEM — G93.32 CHRONIC FATIGUE SYNDROME: Status: ACTIVE | Noted: 2019-11-08

## 2019-11-11 RX ORDER — ATOMOXETINE 40 MG/1
CAPSULE ORAL
Qty: 30 CAPSULE | Refills: 0 | Status: SHIPPED | OUTPATIENT
Start: 2019-11-11 | End: 2019-11-12 | Stop reason: SDUPTHER

## 2019-11-12 ENCOUNTER — TELEPHONE (OUTPATIENT)
Dept: FAMILY MEDICINE CLINIC | Facility: CLINIC | Age: 39
End: 2019-11-12

## 2019-11-12 RX ORDER — ATOMOXETINE 40 MG/1
CAPSULE ORAL
Qty: 90 CAPSULE | Refills: 0 | Status: SHIPPED | OUTPATIENT
Start: 2019-11-12 | End: 2020-03-12 | Stop reason: SDUPTHER

## 2019-11-12 NOTE — TELEPHONE ENCOUNTER
Beth from [facility] called and they are needing a clarification on a medication modafinil (PROVIGIL) 200 MG tablet. They were wondering if she had a sleep study or not? The case number is [id number] and the phone number to call back is [phone].      Thank you,      Gerri

## 2019-12-05 RX ORDER — DULOXETIN HYDROCHLORIDE 30 MG/1
30 CAPSULE, DELAYED RELEASE ORAL DAILY
Qty: 30 CAPSULE | Refills: 3 | Status: SHIPPED | OUTPATIENT
Start: 2019-12-05 | End: 2020-02-27 | Stop reason: SDUPTHER

## 2019-12-05 RX ORDER — TRAZODONE HYDROCHLORIDE 50 MG/1
50 TABLET ORAL NIGHTLY
Qty: 30 TABLET | Refills: 3 | Status: SHIPPED | OUTPATIENT
Start: 2019-12-05 | End: 2020-02-11

## 2019-12-10 RX ORDER — TOPIRAMATE 25 MG/1
25 TABLET ORAL 2 TIMES DAILY
Qty: 60 TABLET | Refills: 2 | Status: SHIPPED | OUTPATIENT
Start: 2019-12-10 | End: 2020-02-11

## 2019-12-11 ENCOUNTER — TELEPHONE (OUTPATIENT)
Dept: FAMILY MEDICINE CLINIC | Facility: CLINIC | Age: 39
End: 2019-12-11

## 2019-12-11 ENCOUNTER — OFFICE VISIT (OUTPATIENT)
Dept: FAMILY MEDICINE CLINIC | Facility: CLINIC | Age: 39
End: 2019-12-11

## 2019-12-11 VITALS
DIASTOLIC BLOOD PRESSURE: 70 MMHG | BODY MASS INDEX: 23.92 KG/M2 | WEIGHT: 130 LBS | HEART RATE: 94 BPM | OXYGEN SATURATION: 97 % | HEIGHT: 62 IN | SYSTOLIC BLOOD PRESSURE: 120 MMHG

## 2019-12-11 DIAGNOSIS — G93.32 CHRONIC FATIGUE SYNDROME: ICD-10-CM

## 2019-12-11 DIAGNOSIS — R56.9 SEIZURE (HCC): Primary | ICD-10-CM

## 2019-12-11 DIAGNOSIS — R56.9 SEIZURES (HCC): ICD-10-CM

## 2019-12-11 DIAGNOSIS — G93.31 POSTVIRAL FATIGUE SYNDROME: ICD-10-CM

## 2019-12-11 PROCEDURE — 90471 IMMUNIZATION ADMIN: CPT | Performed by: FAMILY MEDICINE

## 2019-12-11 PROCEDURE — 99214 OFFICE O/P EST MOD 30 MIN: CPT | Performed by: FAMILY MEDICINE

## 2019-12-11 PROCEDURE — 90670 PCV13 VACCINE IM: CPT | Performed by: FAMILY MEDICINE

## 2019-12-11 PROCEDURE — 90472 IMMUNIZATION ADMIN EACH ADD: CPT | Performed by: FAMILY MEDICINE

## 2019-12-11 PROCEDURE — 90715 TDAP VACCINE 7 YRS/> IM: CPT | Performed by: FAMILY MEDICINE

## 2019-12-11 RX ORDER — GABAPENTIN 400 MG/1
400 CAPSULE ORAL 3 TIMES DAILY
COMMUNITY
End: 2019-12-11 | Stop reason: SDUPTHER

## 2019-12-11 RX ORDER — TIZANIDINE 4 MG/1
4 TABLET ORAL NIGHTLY PRN
Qty: 90 TABLET | Refills: 2 | Status: SHIPPED | OUTPATIENT
Start: 2019-12-11 | End: 2020-02-11

## 2019-12-11 RX ORDER — GABAPENTIN 400 MG/1
400 CAPSULE ORAL 3 TIMES DAILY
Qty: 90 CAPSULE | Refills: 2 | Status: SHIPPED | OUTPATIENT
Start: 2019-12-11 | End: 2020-03-03 | Stop reason: SDUPTHER

## 2019-12-11 NOTE — TELEPHONE ENCOUNTER
Sissy on [facility] called regarding the patients script gabapentin (NEURONTIN) 400 MG capsule. They would like a call back please at [phone].    Thank you,    Gerri

## 2019-12-11 NOTE — PROGRESS NOTES
Subjective:     Anne-Marie Ellison is a 39 y.o. female who presents for   Chief Complaint   Patient presents with   • follow up seizures   • medication  check       39-year-old female with history of seizure disorder chronic migraine headaches EBV syndrome and post viral syndrome with fatigue here today for one-month follow-up.  Patient was to be started on Provigil last month however her insurance did not cover it and we tried her on Strattera instead she was started on 40 mg daily.  She did well for 2 weeks and then on November 26 while in [place] she had an episode of decreased responsiveness according to her  lasting 30 to 60 seconds a second episode occurred on 12 2 this 1 however lasted much longer the  states that she was nonresponsive when he got home from work and had apparently been nonresponsive for about 30 minutes she came out of it shortly after he got home however she was in a somewhat postictal state for about another 45 minutes or so before she finally came out of it completely the following day she had 2 short episodes close together each 1 lasting about 30 seconds since then she has had no further symptoms it is now been a week since her last episode.  Patient was started on gabapentin back in September for seizures she was seen by a neurologist up in [place] at that time she had been seizure-free up until the recent trip to [place] she states that things at home seem to be calming down somewhat and she is even anticipating getting custody of 1 of her children her mother has moved out and she and her  are still looking forward to moving to [place] after he retires.  Patient currently is not driving because of her seizures she states that her fatigue and hypersomnia are markedly improved since starting on the Strattera and she has had no further headaches since her last visit patient is due for her influenza or pneumococcal and her Tdap along with a referral for  zoster vaccine.  Patient denies any substance abuse or EtOH although she does relate to having an occasional glass of wine        Past Medical Hx:  Past Medical History:   Diagnosis Date   • Migraine    • Seizures (CMS/HCC) 09/13/2019       Past Surgical Hx:  Past Surgical History:   Procedure Laterality Date   • ANKLE SURGERY Right    • CHOLECYSTECTOMY     • HEMORRHOIDECTOMY Left 10/7/2019    Procedure: HEMORRHOIDECTOMY;  Surgeon: El Lazcano MD;  Location: [facility];  Service: General   • HYSTERECTOMY     • WRIST SURGERY         Health Maintenance:  Health Maintenance   Topic Date Due   • ANNUAL PHYSICAL  12/07/1982   • TDAP/TD VACCINES (1 - Tdap) 12/07/1990   • PNEUMOCOCCAL VACCINE (19-64 MEDIUM RISK) (1 of 1 - PPSV23) 12/07/1998   • INFLUENZA VACCINE  08/01/2019       Current Meds:    Current Outpatient Medications:   •  atomoxetine (STRATTERA) 40 MG capsule, Take one cap in the morning with food, Disp: 90 capsule, Rfl: 0  •  B Complex Vitamins (B COMPLEX-B12 PO), Take  by mouth Daily., Disp: , Rfl:   •  butalbital-acetaminophen-caffeine (ESGIC) -40 MG per tablet, Take 1 tablet by mouth Every 4 (Four) Hours As Needed for Headache (no more than 2 tabs per week)., Disp: 30 tablet, Rfl: 0  •  cholecalciferol (VITAMIN D3) 15944 units capsule, Take 50,000 Units by mouth 1 (One) Time Per Week., Disp: , Rfl:   •  DULoxetine (CYMBALTA) 30 MG capsule, Take 1 capsule by mouth Daily., Disp: 30 capsule, Rfl: 3  •  gabapentin (NEURONTIN) 400 MG capsule, Take 1 capsule by mouth 3 (Three) Times a Day., Disp: 90 capsule, Rfl: 2  •  linaCLOtide (LINZESS PO), Take  by mouth Daily., Disp: , Rfl:   •  pantoprazole (PROTONIX) 40 MG EC tablet, Take 1 tablet by mouth Daily., Disp: 30 tablet, Rfl: 3  •  sucralfate (CARAFATE) 1 g tablet, Take 1 g by mouth 4 (Four) Times a Day., Disp: , Rfl:   •  topiramate (TOPAMAX) 25 MG tablet, Take 1 tablet by mouth 2 (Two) Times a Day., Disp: 60 tablet, Rfl: 2  •  traZODone (DESYREL) 50 MG  "tablet, Take 1 tablet by mouth Every Night., Disp: 30 tablet, Rfl: 3  •  tiZANidine (ZANAFLEX) 4 MG tablet, Take 1 tablet by mouth At Night As Needed for Muscle Spasms., Disp: 90 tablet, Rfl: 2    Allergies:  Nsaids    Family Hx:  Family History   Problem Relation Age of Onset   • Hypertension Mother    • Heart disease Father    • COPD Father         Social History:  Social History     Socioeconomic History   • Marital status:      Spouse name: Not on file   • Number of children: Not on file   • Years of education: Not on file   • Highest education level: Not on file   Tobacco Use   • Smoking status: Current Every Day Smoker     Packs/day: 1.00     Types: Cigarettes   • Smokeless tobacco: Never Used   Substance and Sexual Activity   • Alcohol use: No     Frequency: Never   • Drug use: No       Review of Systems  Review of Systems as per HPI otherwise -12 systems reviewed no other neurologic complaints at present    Objective:     /70 (BP Location: Left arm, Patient Position: Sitting, Cuff Size: Adult)   Pulse 94   Ht 157.5 cm (61.99\")   Wt 59 kg (130 lb)   SpO2 97%   BMI 23.79 kg/m²   Physical Exam   General appearance that of a well-developed well-nourished appearing adult female no acute distress oriented x3 her affect is appropriate ENT grossly normal without asymmetry PERRLA no photophobia EOMs intact without nystagmus neck supple without JVD or adenopathy lungs clear to auscultation and percussion without wheezes rales rhonchi tactile fremitus or pleural friction rub heart regular without murmur rub gallop or extrasystole PMI diffuse no chest wall tenderness noted extremities show no tremor DTRs are briskly active without clonus no edema neuro nonfocal no ataxia    Lab Review  Results for orders placed or performed during the hospital encounter of 10/07/19   Tissue Pathology Exam   Result Value Ref Range    Case Report       Surgical Pathology Report                         Case: [id number]    " "                              Authorizing Provider:  El Lazcano MD           Collected:           10/07/2019 05:11 PM          Ordering Location:     [facility]             Received:            10/08/2019 07:13 AM                                 [facility]                                                              Pathologist:           Ernesto Mac MD                                                         Specimen:    Hemorrhoid(s), hemorrhoid                                                                  Final Diagnosis       ANUS:  EXTERNAL HEMORRHOIDS WITH ORGANIZING THROMBI.      Gross Description       The container is labeled \"hemorrhoids\" and has a gray-tan mucosal mass measuring 1.5 x 0.9 x 0.5 cm.  The cut surfaces are heavily congested.  The entire specimen is embedded as 1A.              Assessment:     Anne-Marie was seen today for follow up seizures and medication  check.    Diagnoses and all orders for this visit:    Seizure (CMS/HCC)  -     gabapentin (NEURONTIN) 400 MG capsule; Take 1 capsule by mouth 3 (Three) Times a Day.    Seizures (CMS/HCC)    Chronic fatigue syndrome    Postviral fatigue syndrome    Other orders  -     tiZANidine (ZANAFLEX) 4 MG tablet; Take 1 tablet by mouth At Night As Needed for Muscle Spasms.  -     Tdap Vaccine Greater Than or Equal To 8yo IM  -     Pneumococcal Conjugate Vaccine 13-Valent All        Plan:   Discussed various options with the patient will cautiously increase her gabapentin to 100 mg she currently is taking 300 mg 3 times daily eventually like to go to 400 3 times daily however until she runs out of her 300s which she got filled yesterday we will go to 300 4 times daily transition to 400 3 times daily refill given on tizanidine pneumococcal vaccine and Tdap given patient also given Rx for Shingrix vaccine to take to her pharmacy.  Recheck 3 months or as needed.            This document has been electronically signed by Zachary Nagy MD " on December 11, 2019 4:21 PM

## 2019-12-18 ENCOUNTER — TELEPHONE (OUTPATIENT)
Dept: FAMILY MEDICINE CLINIC | Facility: CLINIC | Age: 39
End: 2019-12-18

## 2020-01-06 ENCOUNTER — APPOINTMENT (OUTPATIENT)
Dept: LAB | Facility: HOSPITAL | Age: 40
End: 2020-01-06

## 2020-01-06 ENCOUNTER — OFFICE VISIT (OUTPATIENT)
Dept: FAMILY MEDICINE CLINIC | Facility: CLINIC | Age: 40
End: 2020-01-06

## 2020-01-06 VITALS
BODY MASS INDEX: 22.64 KG/M2 | RESPIRATION RATE: 20 BRPM | TEMPERATURE: 97.7 F | SYSTOLIC BLOOD PRESSURE: 126 MMHG | WEIGHT: 127.8 LBS | HEART RATE: 102 BPM | DIASTOLIC BLOOD PRESSURE: 80 MMHG | OXYGEN SATURATION: 98 % | HEIGHT: 63 IN

## 2020-01-06 DIAGNOSIS — R10.2 SUPRAPUBIC PAIN: ICD-10-CM

## 2020-01-06 DIAGNOSIS — N30.00 ACUTE CYSTITIS WITHOUT HEMATURIA: Primary | ICD-10-CM

## 2020-01-06 DIAGNOSIS — R21 RASH OF FACE: ICD-10-CM

## 2020-01-06 DIAGNOSIS — R30.0 DYSURIA: ICD-10-CM

## 2020-01-06 PROBLEM — K21.00 GASTRO-ESOPHAGEAL REFLUX DISEASE WITH ESOPHAGITIS: Status: ACTIVE | Noted: 2020-01-06

## 2020-01-06 PROBLEM — E28.39 PREMATURE OVARIAN FAILURE: Status: ACTIVE | Noted: 2020-01-06

## 2020-01-06 PROBLEM — K26.9 DUODENAL ULCER DISEASE: Status: ACTIVE | Noted: 2020-01-06

## 2020-01-06 PROBLEM — F31.9 BIPOLAR DISORDER (HCC): Status: ACTIVE | Noted: 2020-01-06

## 2020-01-06 PROBLEM — K85.90 PANCREATITIS: Status: ACTIVE | Noted: 2020-01-06

## 2020-01-06 PROBLEM — K59.09 CHRONIC CONSTIPATION: Status: ACTIVE | Noted: 2020-01-06

## 2020-01-06 PROBLEM — R56.9 SEIZURE-LIKE ACTIVITY (HCC): Status: ACTIVE | Noted: 2019-09-13

## 2020-01-06 PROBLEM — F90.2 ATTENTION DEFICIT HYPERACTIVITY DISORDER, COMBINED TYPE: Status: ACTIVE | Noted: 2020-01-06

## 2020-01-06 PROBLEM — J30.9 ALLERGIC RHINITIS: Status: ACTIVE | Noted: 2020-01-06

## 2020-01-06 PROBLEM — Z86.69 HISTORY OF MIGRAINE: Status: ACTIVE | Noted: 2019-09-13

## 2020-01-06 LAB
BILIRUB BLD-MCNC: ABNORMAL MG/DL
CLARITY, POC: ABNORMAL
COLOR UR: ABNORMAL
GLUCOSE UR STRIP-MCNC: NEGATIVE MG/DL
KETONES UR QL: NEGATIVE
LEUKOCYTE EST, POC: ABNORMAL
NITRITE UR-MCNC: POSITIVE MG/ML
PH UR: 6.5 [PH] (ref 5–8)
PROT UR STRIP-MCNC: ABNORMAL MG/DL
RBC # UR STRIP: NEGATIVE /UL
SP GR UR: 1.01 (ref 1–1.03)
UROBILINOGEN UR QL: NORMAL

## 2020-01-06 PROCEDURE — 87186 SC STD MICRODIL/AGAR DIL: CPT | Performed by: STUDENT IN AN ORGANIZED HEALTH CARE EDUCATION/TRAINING PROGRAM

## 2020-01-06 PROCEDURE — 81002 URINALYSIS NONAUTO W/O SCOPE: CPT | Performed by: STUDENT IN AN ORGANIZED HEALTH CARE EDUCATION/TRAINING PROGRAM

## 2020-01-06 PROCEDURE — 99213 OFFICE O/P EST LOW 20 MIN: CPT | Performed by: STUDENT IN AN ORGANIZED HEALTH CARE EDUCATION/TRAINING PROGRAM

## 2020-01-06 PROCEDURE — 87086 URINE CULTURE/COLONY COUNT: CPT | Performed by: STUDENT IN AN ORGANIZED HEALTH CARE EDUCATION/TRAINING PROGRAM

## 2020-01-06 RX ORDER — FAMOTIDINE 20 MG/1
20 TABLET, FILM COATED ORAL 2 TIMES DAILY
Qty: 10 TABLET | Refills: 0 | Status: SHIPPED | OUTPATIENT
Start: 2020-01-06 | End: 2020-01-11

## 2020-01-06 RX ORDER — TOPIRAMATE 25 MG/1
25 CAPSULE, COATED PELLETS ORAL 2 TIMES DAILY
COMMUNITY
Start: 2019-11-15 | End: 2020-01-06 | Stop reason: SDUPTHER

## 2020-01-06 RX ORDER — SULFAMETHOXAZOLE AND TRIMETHOPRIM 800; 160 MG/1; MG/1
1 TABLET ORAL 2 TIMES DAILY
Qty: 10 TABLET | Refills: 0 | Status: SHIPPED | OUTPATIENT
Start: 2020-01-06 | End: 2020-01-11

## 2020-01-06 RX ORDER — CALAMINE
LOTION (ML) TOPICAL 3 TIMES DAILY
Qty: 118 ML | Refills: 0 | Status: SHIPPED | OUTPATIENT
Start: 2020-01-06

## 2020-01-06 RX ORDER — DIPHENHYDRAMINE HCL 25 MG
25 TABLET ORAL EVERY 12 HOURS
Qty: 10 TABLET | Refills: 0 | Status: SHIPPED | OUTPATIENT
Start: 2020-01-06 | End: 2020-01-11

## 2020-01-06 NOTE — PROGRESS NOTES
"  Family Medicine Residency  Ralph Maynard MD    Subjective:     Anne-Marie Teixeira is a 39 y.o. female who presents for initial evaluation of dysuria, suprapubic pain, right flank pain of 2 weeks duration.    Patient reports that she has a history of recurrent and frequent urinary tract infections due to a \"distended bladder\" causing incomplete bladder emptying leading to recurrent infections.  She has never been evaluated by urologist.  Last known urinary tract infection was on 2/2019 treated with Bactrim.  She currently reports a 2-week history of right flank pain and a several day history of burning with urination, suprapubic pain, urinary frequency.  Suprapubic pain is described as sharp, intermittent, localized, 7/10.  Aggravated by pressure.  Alleviated with emptying bladder.  No associated symptoms.    She additionally complains of a rash of 2 to 3 days duration.  Rashes on her face diffusely.  Only recent event that she can attribute to this is putting away an artificial [place] tree that was gifted to her family by family friend.  Previously this tree had been stored in an attic uncovered or protected.  She reports pruritus and swollen stiff sensation on her face diffusely. She does endorse a history of allergies having been skin tested and allergic \"to just about everything.\"    No other complaints.    The following portions of the patient's history were reviewed and updated as appropriate: allergies, current medications, past family history, past medical history, past social history, past surgical history and problem list.    Past Medical Hx:  Past Medical History:   Diagnosis Date   • Migraine    • Seizures (CMS/HCC) 09/13/2019       Past Surgical Hx:  Past Surgical History:   Procedure Laterality Date   • ANKLE SURGERY Right    • CHOLECYSTECTOMY     • HEMORRHOIDECTOMY Left 10/7/2019    Procedure: HEMORRHOIDECTOMY;  Surgeon: El Lazcano MD;  Location[facility];  Service: General   • " HYSTERECTOMY     • WRIST SURGERY         Current Meds:    Current Outpatient Medications:   •  atomoxetine (STRATTERA) 40 MG capsule, Take one cap in the morning with food, Disp: 90 capsule, Rfl: 0  •  B Complex Vitamins (B COMPLEX-B12 PO), Take  by mouth Daily., Disp: , Rfl:   •  butalbital-acetaminophen-caffeine (ESGIC) -40 MG per tablet, Take 1 tablet by mouth Every 4 (Four) Hours As Needed for Headache (no more than 2 tabs per week)., Disp: 30 tablet, Rfl: 0  •  cholecalciferol (VITAMIN D3) 04419 units capsule, Take 50,000 Units by mouth 1 (One) Time Per Week., Disp: , Rfl:   •  DULoxetine (CYMBALTA) 30 MG capsule, Take 1 capsule by mouth Daily., Disp: 30 capsule, Rfl: 3  •  gabapentin (NEURONTIN) 400 MG capsule, Take 1 capsule by mouth 3 (Three) Times a Day., Disp: 90 capsule, Rfl: 2  •  linaCLOtide (LINZESS PO), Take  by mouth Daily., Disp: , Rfl:   •  pantoprazole (PROTONIX) 40 MG EC tablet, Take 1 tablet by mouth Daily., Disp: 30 tablet, Rfl: 3  •  sucralfate (CARAFATE) 1 g tablet, Take 1 g by mouth 4 (Four) Times a Day., Disp: , Rfl:   •  tiZANidine (ZANAFLEX) 4 MG tablet, Take 1 tablet by mouth At Night As Needed for Muscle Spasms., Disp: 90 tablet, Rfl: 2  •  topiramate (TOPAMAX) 25 MG tablet, Take 1 tablet by mouth 2 (Two) Times a Day., Disp: 60 tablet, Rfl: 2  •  traZODone (DESYREL) 50 MG tablet, Take 1 tablet by mouth Every Night., Disp: 30 tablet, Rfl: 3  •  calamine lotion, Apply  topically to the appropriate area as directed 3 (Three) Times a Day., Disp: 118 mL, Rfl: 0  •  diphenhydrAMINE (BENADRYL ALLERGY) 25 MG tablet, Take 1 tablet by mouth Every 12 (Twelve) Hours for 5 days., Disp: 10 tablet, Rfl: 0  •  famotidine (PEPCID) 20 MG tablet, Take 1 tablet by mouth 2 (Two) Times a Day for 5 days., Disp: 10 tablet, Rfl: 0  •  sulfamethoxazole-trimethoprim (BACTRIM DS) 800-160 MG per tablet, Take 1 tablet by mouth 2 (Two) Times a Day for 5 days., Disp: 10 tablet, Rfl: 0  •  topiramate (TOPAMAX) 25  "MG capsule, Take 25 mg by mouth 2 (Two) Times a Day., Disp: , Rfl:     Allergies:  Allergies   Allergen Reactions   • Nsaids GI Intolerance     Pt has ulcers       Family Hx:  Family History   Problem Relation Age of Onset   • Hypertension Mother    • Heart disease Father    • COPD Father         Social History:  Social History     Socioeconomic History   • Marital status:      Spouse name: Not on file   • Number of children: Not on file   • Years of education: Not on file   • Highest education level: Not on file   Tobacco Use   • Smoking status: Current Every Day Smoker     Packs/day: 1.00     Types: Cigarettes   • Smokeless tobacco: Never Used   Substance and Sexual Activity   • Alcohol use: No     Frequency: Never   • Drug use: No   • Sexual activity: Defer       Review of Systems  Review of Systems   Constitutional: Negative for appetite change, diaphoresis, fatigue and fever.   HENT: Negative for congestion, ear pain, postnasal drip, rhinorrhea and sore throat.    Eyes: Negative for pain and visual disturbance.   Respiratory: Negative for cough, chest tightness and shortness of breath.    Cardiovascular: Negative for chest pain, palpitations and leg swelling.   Gastrointestinal: Negative for abdominal pain, constipation, diarrhea, nausea and vomiting.   Genitourinary: Positive for dysuria, flank pain, frequency and urgency.   Musculoskeletal: Negative for arthralgias, back pain and myalgias.   Skin: Positive for rash. Negative for pallor.   Neurological: Negative for dizziness, syncope, weakness and numbness.       Objective:     /80 (BP Location: Right arm, Patient Position: Sitting, Cuff Size: Adult)   Pulse 102   Temp 97.7 °F (36.5 °C) (Temporal)   Resp 20   Ht 160 cm (63\")   Wt 58 kg (127 lb 12.8 oz)   SpO2 98%   BMI 22.64 kg/m²   Physical Exam   Constitutional: She is oriented to person, place, and time. She appears well-developed and well-nourished.   HENT:   Head: Normocephalic and " atraumatic.   Right Ear: External ear normal.   Left Ear: External ear normal.   Nose: Nose normal.   Mouth/Throat: Oropharynx is clear and moist.   Eyes: Pupils are equal, round, and reactive to light. Conjunctivae and EOM are normal.   Neck: Normal range of motion. Neck supple. No thyromegaly present.   Cardiovascular: Normal rate, regular rhythm, normal heart sounds and intact distal pulses.   Pulmonary/Chest: Effort normal and breath sounds normal. No respiratory distress. She has no wheezes.   Abdominal: Soft. Bowel sounds are normal. She exhibits no distension. There is tenderness in the suprapubic area. There is no rebound and no guarding.   Musculoskeletal: Normal range of motion. She exhibits tenderness (R CVA tenderness).   R flank pain   Lymphadenopathy:     She has no cervical adenopathy.   Neurological: She is alert and oriented to person, place, and time. She displays normal reflexes.   Skin: Skin is warm and dry. Capillary refill takes less than 2 seconds. Rash (diffuse rash over jaw line, chin, forehead, cheeks) noted.   Psychiatric: She has a normal mood and affect. Her behavior is normal.        Assessment/Plan:     Anne-Marie was seen today for urinary tract infection.    Diagnoses and all orders for this visit:    Acute cystitis without hematuria  New, acute, worsening. Patient with POCT urinalysis showing leukocyte esterase and nitrite positive. Will send for urine culture and sensitivity. Will empirically prescribe Bactrim (renal function good as of 9/2019) and adjust treatment as culture indicates.  -     POCT urinalysis dipstick, manual  -     sulfamethoxazole-trimethoprim (BACTRIM DS) 800-160 MG per tablet; Take 1 tablet by mouth 2 (Two) Times a Day for 5 days.  -     Urine Culture - Urine, Urine, Clean Catch; Future  -     Urine Culture - Urine, Urine, Clean Catch    Dysuria  As above  -     POCT urinalysis dipstick, manual  -     Urine Culture - Urine, Urine, Clean Catch; Future  -     Urine  Culture - Urine, Urine, Clean Catch    Suprapubic pain  As above  -     POCT urinalysis dipstick, manual  -     Urine Culture - Urine, Urine, Clean Catch; Future  -     Urine Culture - Urine, Urine, Clean Catch    Rash of face  Patient with unknown allergen exposure but possibly related to artificial tree. Patient endorses history of allergy skin testing with many allergens identified but currently unknown. Will treat with 5 days of H1 and H2 blockade and Calamine lotion for soothing relief. Not a candidate for steroids as she is currently fighting an infection.   -     calamine lotion; Apply  topically to the appropriate area as directed 3 (Three) Times a Day.  -     diphenhydrAMINE (BENADRYL ALLERGY) 25 MG tablet; Take 1 tablet by mouth Every 12 (Twelve) Hours for 5 days.  -     famotidine (PEPCID) 20 MG tablet; Take 1 tablet by mouth 2 (Two) Times a Day for 5 days.        · Rx changes: start bactrim  · Patient Education: exercise  · Compliance at present is estimated to be fair.   · Efforts to improve compliance (if necessary) will be directed at increased exercise.     Follow-up:     Return if symptoms worsen or fail to improve. Follow up with PCP, Dr. Nagy.     Preventative:  Health Maintenance   Topic Date Due   • ANNUAL PHYSICAL  12/07/1982   • LIPID PANEL  01/06/2020   • PNEUMOCOCCAL VACCINE (19-64 MEDIUM RISK) (1 of 1 - PPSV23) 02/05/2020   • TDAP/TD VACCINES (2 - Td) 12/11/2029   • INFLUENZA VACCINE  Completed     Female Preventative: UTD  Recommended: none  Vaccine Counseling: N/A    Weight  -Class: Normal: 18.5-24.9kg/m2  -Patient's Body mass index is 22.64 kg/m². BMI is within normal parameters. No follow-up required..   decrease soda or juice intake, increase water intake, increase physical activity, cut out extra servings, reduce fast food intake, plan meals, eat breakfast and have 3 meals a day    Alcohol use:  reports that she does not drink alcohol.  Nicotine status  reports that she has been  smoking cigarettes. She has been smoking about 1.00 pack per day. She has never used smokeless tobacco.    Goals    None         RISK SCORE: 3      Ralph Maynard M.D. PGY2  [facility] Medicine Residency  [address] [place] [address]  Office: [phone]    This document has been electronically signed by Ralph Maynard MD on January 6, 2020 5:01 PM

## 2020-01-07 NOTE — PROGRESS NOTES
I have seen the patient.  I have reviewed the notes, assessments, and/or procedures performed by dr Maynard, I concur with her/his documentation and assessment and plan for Anne-Marie Teixeira.               This document has been electronically signed by Zachary Nagy MD on January 7, 2020 10:22 AM

## 2020-01-08 DIAGNOSIS — N39.0 RECURRENT UTI: Primary | ICD-10-CM

## 2020-01-08 LAB — BACTERIA SPEC AEROBE CULT: ABNORMAL

## 2020-01-08 NOTE — PROGRESS NOTES
Called patient to inform of Urine culture confirmation of UTI. She has >100K CFU's of E. Coli currently treated with Bactrim DS that was found to be susceptible. Patient would like a formal urology evaluation as she reports to have had 10-12 UTI's in the last 5 years that have been disruptive to daily life.       Ralph Maynard M.D. PGY2  [facility] Medicine [facility]  [address] [place] [address]  Office: [phone]    This document has been electronically signed by Ralph Maynard MD on January 8, 2020 1:48 PM

## 2020-01-14 ENCOUNTER — OFFICE VISIT (OUTPATIENT)
Dept: FAMILY MEDICINE CLINIC | Facility: CLINIC | Age: 40
End: 2020-01-14

## 2020-01-14 VITALS
TEMPERATURE: 99.6 F | OXYGEN SATURATION: 99 % | DIASTOLIC BLOOD PRESSURE: 78 MMHG | SYSTOLIC BLOOD PRESSURE: 110 MMHG | WEIGHT: 125 LBS | BODY MASS INDEX: 22.15 KG/M2 | HEART RATE: 88 BPM | HEIGHT: 63 IN

## 2020-01-14 DIAGNOSIS — R51.9 FREQUENT HEADACHES: ICD-10-CM

## 2020-01-14 DIAGNOSIS — G93.31 POSTVIRAL FATIGUE SYNDROME: ICD-10-CM

## 2020-01-14 DIAGNOSIS — J02.9 ACUTE PHARYNGITIS, UNSPECIFIED ETIOLOGY: Primary | ICD-10-CM

## 2020-01-14 PROCEDURE — 99213 OFFICE O/P EST LOW 20 MIN: CPT | Performed by: FAMILY MEDICINE

## 2020-01-14 RX ORDER — AZITHROMYCIN 250 MG/1
TABLET, FILM COATED ORAL
Qty: 6 TABLET | Refills: 0 | Status: SHIPPED | OUTPATIENT
Start: 2020-01-14

## 2020-01-14 RX ORDER — BUTALBITAL, ACETAMINOPHEN AND CAFFEINE 50; 325; 40 MG/1; MG/1; MG/1
1 TABLET ORAL EVERY 4 HOURS PRN
Qty: 30 TABLET | Refills: 0 | Status: SHIPPED | OUTPATIENT
Start: 2020-01-14

## 2020-01-14 NOTE — PROGRESS NOTES
Anne-Marie Cruz Puneet            1/14/2020    Chief Complaint   Patient presents with   • Fever   • Cough     productive   • Diarrhea   • Sore Throat               39-year-old female with history of chronic fatigue frequent headaches and seizures presents today with a 3-day history of URI symptoms with subjective fever sore throat and intermittently productive cough.  Her cough is productive of greenish and yellowish sputum patient notes subjective fever and pain with swallowing denies nausea or vomiting she does note increased frequency of loose stools and bilateral otalgia with fullness in both ears along with nasal congestion and frontal headaches patient has been off of work this week because of this.    Current Outpatient Medications on File Prior to Visit   Medication Sig Dispense Refill   • atomoxetine (STRATTERA) 40 MG capsule Take one cap in the morning with food 90 capsule 0   • B Complex Vitamins (B COMPLEX-B12 PO) Take  by mouth Daily.     • calamine lotion Apply  topically to the appropriate area as directed 3 (Three) Times a Day. 118 mL 0   • cholecalciferol (VITAMIN D3) 77352 units capsule Take 50,000 Units by mouth 1 (One) Time Per Week.     • DULoxetine (CYMBALTA) 30 MG capsule Take 1 capsule by mouth Daily. 30 capsule 3   • gabapentin (NEURONTIN) 400 MG capsule Take 1 capsule by mouth 3 (Three) Times a Day. 90 capsule 2   • linaCLOtide (LINZESS PO) Take  by mouth Daily.     • pantoprazole (PROTONIX) 40 MG EC tablet Take 1 tablet by mouth Daily. 30 tablet 3   • sucralfate (CARAFATE) 1 g tablet Take 1 g by mouth 4 (Four) Times a Day.     • tiZANidine (ZANAFLEX) 4 MG tablet Take 1 tablet by mouth At Night As Needed for Muscle Spasms. 90 tablet 2   • topiramate (TOPAMAX) 25 MG tablet Take 1 tablet by mouth 2 (Two) Times a Day. 60 tablet 2   • traZODone (DESYREL) 50 MG tablet Take 1 tablet by mouth Every Night. 30 tablet 3   • [DISCONTINUED] butalbital-acetaminophen-caffeine (ESGIC) -40 MG per tablet  "Take 1 tablet by mouth Every 4 (Four) Hours As Needed for Headache (no more than 2 tabs per week). 30 tablet 0     No current facility-administered medications on file prior to visit.      Allergies   Allergen Reactions   • Nsaids GI Intolerance     Pt has ulcers     Exam alert no distress /78 (BP Location: Left arm)   Pulse 88   Temp 99.6 °F (37.6 °C)   Ht 160 cm (62.99\")   Wt 56.7 kg (125 lb)   SpO2 99%   BMI 22.15 kg/m²   HEENT grossly normal posterior pharynx diffusely erythematous without exudates both TMs are dull without air-fluid level neck supple without adenopathy lungs are clear to auscultation percussion heart regular no S3 PMI diffuse abdomen soft nontender extremities show no tremor no edema neuro nonfocal    Rapid strep screen was negative    Encounter Diagnoses   Name Primary?   • Frequent headaches    • Acute pharyngitis, unspecified etiology Yes   • Postviral fatigue syndrome      Plan: Treat supportively with Tylenol fluids rest patient is also currently on over-the-counter Flonase I gave her prescription for a Z-Harry to fill only if her fever persists for 48 hours may return to work when afebrile for 24 hours recheck otherwise 1 month or as needed.  Patient was also counseled on smoking cessation we will look into potential drug interactions with Wellbutrin and her current medications try this if not contraindicated  "

## 2020-02-04 RX ORDER — PANTOPRAZOLE SODIUM 40 MG/1
40 TABLET, DELAYED RELEASE ORAL DAILY
Qty: 90 TABLET | Refills: 3 | Status: SHIPPED | OUTPATIENT
Start: 2020-02-04 | End: 2020-03-10 | Stop reason: SDUPTHER

## 2020-02-06 ENCOUNTER — OFFICE VISIT (OUTPATIENT)
Dept: FAMILY MEDICINE CLINIC | Facility: CLINIC | Age: 40
End: 2020-02-06

## 2020-02-06 DIAGNOSIS — F43.10 POST TRAUMATIC STRESS DISORDER (PTSD): Primary | ICD-10-CM

## 2020-02-06 PROCEDURE — 90791 PSYCH DIAGNOSTIC EVALUATION: CPT | Performed by: PSYCHOLOGIST
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
## 2020-02-11 ENCOUNTER — OFFICE VISIT (OUTPATIENT)
Dept: FAMILY MEDICINE CLINIC | Facility: CLINIC | Age: 40
End: 2020-02-11

## 2020-02-11 VITALS
OXYGEN SATURATION: 99 % | BODY MASS INDEX: 22.5 KG/M2 | DIASTOLIC BLOOD PRESSURE: 60 MMHG | HEIGHT: 63 IN | HEART RATE: 66 BPM | SYSTOLIC BLOOD PRESSURE: 110 MMHG | WEIGHT: 127 LBS

## 2020-02-11 DIAGNOSIS — G93.32 CHRONIC FATIGUE SYNDROME: Primary | ICD-10-CM

## 2020-02-11 DIAGNOSIS — Z86.69 HISTORY OF MIGRAINE: ICD-10-CM

## 2020-02-11 DIAGNOSIS — R51.9 FREQUENT HEADACHES: ICD-10-CM

## 2020-02-11 DIAGNOSIS — F90.2 ATTENTION DEFICIT HYPERACTIVITY DISORDER, COMBINED TYPE: ICD-10-CM

## 2020-02-11 PROCEDURE — 99213 OFFICE O/P EST LOW 20 MIN: CPT | Performed by: FAMILY MEDICINE

## 2020-02-11 RX ORDER — TOPIRAMATE 25 MG/1
50 TABLET ORAL 2 TIMES DAILY
Qty: 60 TABLET | Refills: 2 | Status: SHIPPED | OUTPATIENT
Start: 2020-02-11 | End: 2020-02-21 | Stop reason: SDUPTHER

## 2020-02-11 RX ORDER — TRAZODONE HYDROCHLORIDE 50 MG/1
100 TABLET ORAL NIGHTLY
Qty: 30 TABLET | Refills: 3 | Status: SHIPPED | OUTPATIENT
Start: 2020-02-11 | End: 2020-02-21 | Stop reason: SDUPTHER

## 2020-02-11 RX ORDER — TIZANIDINE 4 MG/1
4 TABLET ORAL 2 TIMES DAILY PRN
Qty: 90 TABLET | Refills: 2 | Status: SHIPPED | OUTPATIENT
Start: 2020-02-11 | End: 2020-02-21 | Stop reason: SDUPTHER

## 2020-02-11 NOTE — PROGRESS NOTES
Anne-Marie Teixeira                2/11/2020    Chief Complaint   Patient presents with   • follow up headaches                 39-year-old female with history of adult ADHD seizures recurrent headaches with history of migraines childhood adverse event with PTSD and ongoing sleep disturbance here today for scheduled follow-up visit.  Patient quit smoking 7 days ago although she continues to be exposed to secondhand smoke.  She still has difficulty with premature awakening getting 4 to 5 hours of sleep most nights though occasionally she will get a good night sleep and feels rested the next morning she continues to have daytime fatigue however her daytime hypersomnolence is markedly improved she has not had any further issues with morning hangover effect no seizure activity reported she continues to have daily headaches primarily frontal and occipital in nature no other neurologic symptoms headaches are typically relieved with butalbital however she is trying to minimize its use in order to prevent rebound headaches.  Patient reports her visit with Dr. Rendon went well and has a follow-up with him in March.  She and her  are still planning to move to [place] when he is retired in July.    Current Outpatient Medications on File Prior to Visit   Medication Sig Dispense Refill   • atomoxetine (STRATTERA) 40 MG capsule Take one cap in the morning with food 90 capsule 0   • azithromycin (ZITHROMAX Z-EMELIA) 250 MG tablet Take 2 tablets the first day, then 1 tablet daily for 4 days. 6 tablet 0   • B Complex Vitamins (B COMPLEX-B12 PO) Take  by mouth Daily.     • butalbital-acetaminophen-caffeine (ESGIC) -40 MG per tablet Take 1 tablet by mouth Every 4 (Four) Hours As Needed for Headache (no more than 2 tabs per week). 30 tablet 0   • calamine lotion Apply  topically to the appropriate area as directed 3 (Three) Times a Day. 118 mL 0   • cholecalciferol (VITAMIN D3) 77059 units capsule Take 50,000 Units by mouth 1  "(One) Time Per Week.     • DULoxetine (CYMBALTA) 30 MG capsule Take 1 capsule by mouth Daily. 30 capsule 3   • gabapentin (NEURONTIN) 400 MG capsule Take 1 capsule by mouth 3 (Three) Times a Day. 90 capsule 2   • linaCLOtide (LINZESS PO) Take  by mouth Daily.     • pantoprazole (PROTONIX) 40 MG EC tablet Take 1 tablet by mouth Daily. 90 tablet 3   • sucralfate (CARAFATE) 1 g tablet Take 1 g by mouth 4 (Four) Times a Day.     • [DISCONTINUED] tiZANidine (ZANAFLEX) 4 MG tablet Take 1 tablet by mouth At Night As Needed for Muscle Spasms. 90 tablet 2   • [DISCONTINUED] topiramate (TOPAMAX) 25 MG tablet Take 1 tablet by mouth 2 (Two) Times a Day. 60 tablet 2   • [DISCONTINUED] traZODone (DESYREL) 50 MG tablet Take 1 tablet by mouth Every Night. 30 tablet 3     No current facility-administered medications on file prior to visit.      Allergies   Allergen Reactions   • Nsaids GI Intolerance     Pt has ulcers     Exam alert distress /60 (BP Location: Left arm, Patient Position: Sitting, Cuff Size: Adult)   Pulse 66   Ht 160 cm (63\")   Wt 57.6 kg (127 lb)   SpO2 99%   BMI 22.50 kg/m²   HEENT grossly normal without asymmetry neck supple without JVD lungs clear heart regular no S3 no extrasystole abdomen soft bowel sounds active extremities show no tremor no edema neuro nonfocal    Impression   Encounter Diagnoses   Name Primary?   • Chronic fatigue syndrome Yes   • Attention deficit hyperactivity disorder, combined type    • History of migraine    • Frequent headaches      Plan: We will increase her Zanaflex to 4 mg twice daily hopefully this will help her headaches which sound like muscle tension headaches increase Topamax to 50 twice daily and trazodone to 100 nightly patient is warned about morning hangover effect and to contact us if this becomes more of an issue we will continue her on her current dosage of Cymbalta 30 mg along with her gabapentin 400 mg 3 times daily and atomoxetine 40 mg daily patient is " encouraged to continue to abstain from smoking she also recently stopped drinking Dr. lutz rechfer 1 month or as needed

## 2020-02-21 RX ORDER — TRAZODONE HYDROCHLORIDE 50 MG/1
100 TABLET ORAL NIGHTLY
Qty: 90 TABLET | Refills: 3 | Status: SHIPPED | OUTPATIENT
Start: 2020-02-21

## 2020-02-21 RX ORDER — TOPIRAMATE 25 MG/1
50 TABLET ORAL 2 TIMES DAILY
Qty: 120 TABLET | Refills: 2 | Status: SHIPPED | OUTPATIENT
Start: 2020-02-21 | End: 2020-03-10 | Stop reason: SDUPTHER

## 2020-02-21 RX ORDER — TIZANIDINE 4 MG/1
4 TABLET ORAL 2 TIMES DAILY
Qty: 180 TABLET | Refills: 2 | Status: SHIPPED | OUTPATIENT
Start: 2020-02-21 | End: 2020-08-24 | Stop reason: SDUPTHER

## 2020-02-27 RX ORDER — DULOXETIN HYDROCHLORIDE 30 MG/1
30 CAPSULE, DELAYED RELEASE ORAL DAILY
Qty: 90 CAPSULE | Refills: 3 | Status: SHIPPED | OUTPATIENT
Start: 2020-02-27 | End: 2021-03-25

## 2020-03-02 ENCOUNTER — TELEPHONE (OUTPATIENT)
Dept: FAMILY MEDICINE CLINIC | Facility: CLINIC | Age: 40
End: 2020-03-02

## 2020-03-02 NOTE — TELEPHONE ENCOUNTER
Patient called about getting her gabapentin (NEURONTIN) 400 MG capsule refilled    Please call her @ [phone]    Thank You

## 2020-03-02 NOTE — TELEPHONE ENCOUNTER
PATIENT CALLED THIS MORNING ASKING ABOUT HER gabapentin (NEURONTIN) 400 MG capsule. SHE SAID ALL OF HER MEDS WERE CALLED IN AND ARE READY FOR  EXCEPT THAT ONE.  THANKS,  SHERIDAN

## 2020-03-03 DIAGNOSIS — R56.9 SEIZURE (HCC): ICD-10-CM

## 2020-03-03 RX ORDER — GABAPENTIN 400 MG/1
400 CAPSULE ORAL 3 TIMES DAILY
Qty: 90 CAPSULE | Refills: 2 | Status: SHIPPED | OUTPATIENT
Start: 2020-03-03 | End: 2020-04-29 | Stop reason: SDUPTHER

## 2020-03-10 ENCOUNTER — OFFICE VISIT (OUTPATIENT)
Dept: FAMILY MEDICINE CLINIC | Facility: CLINIC | Age: 40
End: 2020-03-10

## 2020-03-10 VITALS
OXYGEN SATURATION: 98 % | HEIGHT: 63 IN | HEART RATE: 81 BPM | SYSTOLIC BLOOD PRESSURE: 110 MMHG | BODY MASS INDEX: 22.68 KG/M2 | WEIGHT: 128 LBS | DIASTOLIC BLOOD PRESSURE: 68 MMHG

## 2020-03-10 DIAGNOSIS — F90.2 ATTENTION DEFICIT HYPERACTIVITY DISORDER, COMBINED TYPE: Primary | ICD-10-CM

## 2020-03-10 DIAGNOSIS — M25.50 POLYARTHRALGIA: ICD-10-CM

## 2020-03-10 DIAGNOSIS — M54.50 ACUTE MIDLINE LOW BACK PAIN WITHOUT SCIATICA: ICD-10-CM

## 2020-03-10 DIAGNOSIS — G47.00 INSOMNIA, UNSPECIFIED TYPE: ICD-10-CM

## 2020-03-10 DIAGNOSIS — R51.9 FREQUENT HEADACHES: ICD-10-CM

## 2020-03-10 DIAGNOSIS — G93.31 POSTVIRAL FATIGUE SYNDROME: ICD-10-CM

## 2020-03-10 PROCEDURE — 99213 OFFICE O/P EST LOW 20 MIN: CPT | Performed by: FAMILY MEDICINE

## 2020-03-10 RX ORDER — PANTOPRAZOLE SODIUM 40 MG/1
40 TABLET, DELAYED RELEASE ORAL DAILY
Qty: 90 TABLET | Refills: 3 | Status: SHIPPED | OUTPATIENT
Start: 2020-03-10

## 2020-03-10 RX ORDER — TOPIRAMATE 25 MG/1
50 TABLET ORAL 2 TIMES DAILY
Qty: 120 TABLET | Refills: 2 | Status: SHIPPED | OUTPATIENT
Start: 2020-03-10 | End: 2020-05-20 | Stop reason: SDUPTHER

## 2020-03-12 RX ORDER — ATOMOXETINE 40 MG/1
CAPSULE ORAL
Qty: 90 CAPSULE | Refills: 0 | Status: SHIPPED | OUTPATIENT
Start: 2020-03-12 | End: 2020-06-11 | Stop reason: SDUPTHER

## 2020-03-30 ENCOUNTER — E-VISIT (OUTPATIENT)
Dept: FAMILY MEDICINE CLINIC | Facility: CLINIC | Age: 40
End: 2020-03-30

## 2020-03-30 DIAGNOSIS — Z20.822 EXPOSURE TO COVID-19 VIRUS: ICD-10-CM

## 2020-03-30 DIAGNOSIS — J06.9 ACUTE URI: Primary | ICD-10-CM

## 2020-03-30 PROCEDURE — 99422 OL DIG E/M SVC 11-20 MIN: CPT | Performed by: FAMILY MEDICINE

## 2020-03-31 ENCOUNTER — TELEMEDICINE (OUTPATIENT)
Dept: FAMILY MEDICINE CLINIC | Facility: CLINIC | Age: 40
End: 2020-03-31

## 2020-03-31 DIAGNOSIS — F43.10 POST TRAUMATIC STRESS DISORDER (PTSD): Primary | ICD-10-CM

## 2020-03-31 NOTE — PROGRESS NOTES
PROGRESS NOTE  Data:  Anne-Marie Teixeira was seen for their regularly scheduled individual psychotherapy session.    (Scales based on 0 - 10 with 10 being the worst)  Depression: 5 Anxiety: 7   Distress: 3 Sleep: 3   Tasks Completed on Time: 4 Mood: 5   Number of Panic Attacks: 3 Appetite: 4       Mental Status Exam  Appearance:  clean and casually dressed, appropriate  Attitude toward clinician:  cooperative and agreeable   Speech:    Rate:  regular rate and rhythm   Volume:  normal  Motor:  no abnormal movements present  Mood:  Anxious   Affect:  anxious  Thought Processes:  linear, logical, and goal directed  Thought Content:  normal  Suicidal Thoughts:  absent  Homicidal Thoughts:  absent  Perceptual Disturbance: no perceptual disturbance  Attention and Concentration:  good  Insight and Judgement:  good  Memory:  memory appears to be intact    Patient's Support Network Includes:  She lives in the home with her spouse and daughter.    Assessment/Plan   Clinical Maneuvering/Intervention:  Therapist & client discussed: (1) her concern for developing CFS (Chronic Fatigue Syndrome), (2) her concern that her current physician dismisses her concerns about CFS, (3) the ways she can reduce anxiety, (4) her concerns regarding COVID 19, (5) the use of REBT skills, and (6) the concerns with trying to obtain custody of her 14 year old daughter who is visiting her at this time.  Diagnoses and all orders for this visit:    Post traumatic stress disorder (PTSD)      No follow-ups on file.  I spent 60 minutes in direct face to face contact with patient.  Greater than 50% of this time was spent counseling patient and discussing plan of care.

## 2020-03-31 NOTE — PATIENT INSTRUCTIONS
I recommended 7-day home quarantine however this likely will extend to 14 days if patient continues to be symptomatic I also recommended that her  stay home as well

## 2020-03-31 NOTE — PROGRESS NOTES
"Subjective   Anne-Maire Teixeira is a 39 y.o. female.     History of Present Illness   39-year-old female history of reactive airways chronic post viral fatigue and possible corona virus exposure.  Patient contacted me through my chart with complaints of chest burning sensation shallow respirations and subjective feelings of low energy she states \"I have to make myself take a deep breath\".  Patient's  works at [employer] and they have been down to [place] on several occasions where the coronavirus is in the community.  The patient states that \"I have felt hot for the past 2 or 3 days\" her temps have been running in the low 97-99.4 range patient currently has been self quarantining along with her  who potentially has been exposed at work      Review of Systems    Objective   Physical Exam      Assessment/Plan   There are no diagnoses linked to this encounter.    Encounter Diagnoses   Name Primary?   • Acute URI Yes   • Exposure to Covid-19 Virus      I did recommend home quarantine with symptomatic and supportive care in the home setting.  Patient was counseled on use of hand sanitizers given potential exposure with quarantine for at least 7 days however likely will extend to 14 days depending on how her  does advise patient if she gets short of breath or spikes a temp greater than 100 she should go into the emergency room for testing    Time spent on my chart and on phone with pt and : 15 min     "

## 2020-04-29 ENCOUNTER — TELEPHONE (OUTPATIENT)
Dept: FAMILY MEDICINE CLINIC | Facility: CLINIC | Age: 40
End: 2020-04-29

## 2020-04-29 DIAGNOSIS — R56.9 SEIZURE (HCC): ICD-10-CM

## 2020-04-29 RX ORDER — GABAPENTIN 400 MG/1
400 CAPSULE ORAL 3 TIMES DAILY
Qty: 90 CAPSULE | Refills: 2 | Status: SHIPPED | OUTPATIENT
Start: 2020-04-29

## 2020-05-20 RX ORDER — TOPIRAMATE 25 MG/1
50 TABLET ORAL 2 TIMES DAILY
Qty: 120 TABLET | Refills: 2 | Status: SHIPPED | OUTPATIENT
Start: 2020-05-20 | End: 2020-08-24 | Stop reason: SDUPTHER

## 2020-06-11 ENCOUNTER — OFFICE VISIT (OUTPATIENT)
Dept: FAMILY MEDICINE CLINIC | Facility: CLINIC | Age: 40
End: 2020-06-11

## 2020-06-11 ENCOUNTER — LAB (OUTPATIENT)
Dept: LAB | Facility: HOSPITAL | Age: 40
End: 2020-06-11

## 2020-06-11 VITALS
HEART RATE: 66 BPM | WEIGHT: 119 LBS | DIASTOLIC BLOOD PRESSURE: 60 MMHG | SYSTOLIC BLOOD PRESSURE: 100 MMHG | BODY MASS INDEX: 21.09 KG/M2 | HEIGHT: 63 IN | OXYGEN SATURATION: 99 %

## 2020-06-11 DIAGNOSIS — T74.32XS: ICD-10-CM

## 2020-06-11 DIAGNOSIS — F90.2 ATTENTION DEFICIT HYPERACTIVITY DISORDER, COMBINED TYPE: ICD-10-CM

## 2020-06-11 DIAGNOSIS — Z86.69 HISTORY OF MIGRAINE: ICD-10-CM

## 2020-06-11 DIAGNOSIS — Z79.899 LONG TERM USE OF DRUG: Primary | ICD-10-CM

## 2020-06-11 DIAGNOSIS — G93.32 CHRONIC FATIGUE SYNDROME: ICD-10-CM

## 2020-06-11 PROCEDURE — 99213 OFFICE O/P EST LOW 20 MIN: CPT | Performed by: FAMILY MEDICINE

## 2020-06-11 PROCEDURE — G0480 DRUG TEST DEF 1-7 CLASSES: HCPCS | Performed by: FAMILY MEDICINE

## 2020-06-11 RX ORDER — ATOMOXETINE 40 MG/1
CAPSULE ORAL
Qty: 90 CAPSULE | Refills: 0 | Status: SHIPPED | OUTPATIENT
Start: 2020-06-11
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
## 2020-06-15 LAB — GABAPENTIN UR-MCNC: NEGATIVE UG/ML

## 2020-07-24 DIAGNOSIS — R56.9 SEIZURE (HCC): ICD-10-CM

## 2020-07-24 RX ORDER — GABAPENTIN 400 MG/1
400 CAPSULE ORAL 3 TIMES DAILY
Qty: 90 CAPSULE | Refills: 1 | Status: CANCELLED | OUTPATIENT
Start: 2020-07-24

## 2020-08-24 RX ORDER — TOPIRAMATE 25 MG/1
TABLET ORAL
Qty: 120 TABLET | Refills: 2 | Status: SHIPPED | OUTPATIENT
Start: 2020-08-24

## 2020-08-24 RX ORDER — TIZANIDINE 4 MG/1
4 TABLET ORAL 2 TIMES DAILY
Qty: 180 TABLET | Refills: 1 | Status: SHIPPED | OUTPATIENT
Start: 2020-08-24

## 2021-03-25 RX ORDER — DULOXETIN HYDROCHLORIDE 30 MG/1
30 CAPSULE, DELAYED RELEASE ORAL DAILY
Qty: 90 CAPSULE | Refills: 3 | Status: SHIPPED | OUTPATIENT
Start: 2021-03-25
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
## (undated) DEVICE — PK MAJ PROC LF 60

## (undated) DEVICE — GLV SURG SENSICARE PI PF LF 7 GRN STRL

## (undated) DEVICE — PREP SOL POVIDONE/IODINE BT 4OZ

## (undated) DEVICE — STERILE POLYISOPRENE POWDER-FREE SURGICAL GLOVES WITH EMOLLIENT COATING: Brand: PROTEXIS

## (undated) DEVICE — SPNG GZ WOVN 4X4IN 12PLY 10/BX STRL

## (undated) DEVICE — PAD,ABDOMINAL,8"X10",ST,LF: Brand: MEDLINE

## (undated) DEVICE — SUT GUT CHRM 2/0 SH 27IN G123H

## (undated) DEVICE — GLV SURG TRIUMPH LT PF LTX 7.5 STRL

## (undated) DEVICE — LUBE JELLY SURGILUBE TB/FLIPCAP 4.5OZ

## (undated) DEVICE — GLV SURG SENSICARE POLYISPRN W/ALOE PF LF 6.5 GRN STRL

## (undated) DEVICE — SOL IRR NACL 0.9PCT BT 1000ML

## (undated) DEVICE — GLV SURG TRIUMPH PF LTX 6.5 STRL

## (undated) DEVICE — GLV SURG SENSICARE PI LF PF 8 GRN STRL